# Patient Record
Sex: FEMALE | Employment: UNEMPLOYED | URBAN - METROPOLITAN AREA
[De-identification: names, ages, dates, MRNs, and addresses within clinical notes are randomized per-mention and may not be internally consistent; named-entity substitution may affect disease eponyms.]

---

## 2017-01-19 ENCOUNTER — APPOINTMENT (OUTPATIENT)
Dept: GENERAL RADIOLOGY | Age: 62
End: 2017-01-19
Attending: PHYSICIAN ASSISTANT
Payer: MEDICARE

## 2017-01-19 ENCOUNTER — HOSPITAL ENCOUNTER (EMERGENCY)
Age: 62
Discharge: HOME | End: 2017-01-19
Attending: EMERGENCY MEDICINE
Payer: MEDICARE

## 2017-01-19 VITALS
WEIGHT: 115 LBS | TEMPERATURE: 98 F | OXYGEN SATURATION: 100 % | HEART RATE: 74 BPM | RESPIRATION RATE: 18 BRPM | BODY MASS INDEX: 18.05 KG/M2 | HEIGHT: 67 IN

## 2017-01-19 DIAGNOSIS — J20.9 ACUTE BRONCHITIS, UNSPECIFIED ORGANISM: Primary | ICD-10-CM

## 2017-01-19 PROCEDURE — 71020 XR CHEST PA LAT: CPT

## 2017-01-19 PROCEDURE — 99282 EMERGENCY DEPT VISIT SF MDM: CPT

## 2017-01-19 RX ORDER — HYDROCODONE POLISTIREX AND CHLORPHENIRAMINE POLISTIREX 10; 8 MG/5ML; MG/5ML
5 SUSPENSION, EXTENDED RELEASE ORAL
Qty: 100 ML | Refills: 0 | Status: SHIPPED | OUTPATIENT
Start: 2017-01-19 | End: 2017-02-10

## 2017-01-19 RX ORDER — AZITHROMYCIN 250 MG/1
TABLET, FILM COATED ORAL
Qty: 6 TAB | Refills: 0 | Status: SHIPPED | OUTPATIENT
Start: 2017-01-19 | End: 2017-01-24

## 2017-01-19 NOTE — ED NOTES
I have reviewed discharge instructions with the patient. The patient verbalized understanding.   Pt ambulatory from ED

## 2017-01-19 NOTE — DISCHARGE INSTRUCTIONS
Bronchitis: Care Instructions  Your Care Instructions    Bronchitis is inflammation of the bronchial tubes, which carry air to the lungs. The tubes swell and produce mucus, or phlegm. The mucus and inflamed bronchial tubes make you cough. You may have trouble breathing. Most cases of bronchitis are caused by viruses like those that cause colds. Antibiotics usually do not help and they may be harmful. Bronchitis usually develops rapidly and lasts about 2 to 3 weeks in otherwise healthy people. Follow-up care is a key part of your treatment and safety. Be sure to make and go to all appointments, and call your doctor if you are having problems. It's also a good idea to know your test results and keep a list of the medicines you take. How can you care for yourself at home? · Take all medicines exactly as prescribed. Call your doctor if you think you are having a problem with your medicine. · Get some extra rest.  · Take an over-the-counter pain medicine, such as acetaminophen (Tylenol), ibuprofen (Advil, Motrin), or naproxen (Aleve) to reduce fever and relieve body aches. Read and follow all instructions on the label. · Do not take two or more pain medicines at the same time unless the doctor told you to. Many pain medicines have acetaminophen, which is Tylenol. Too much acetaminophen (Tylenol) can be harmful. · Take an over-the-counter cough medicine that contains dextromethorphan to help quiet a dry, hacking cough so that you can sleep. Avoid cough medicines that have more than one active ingredient. Read and follow all instructions on the label. · Breathe moist air from a humidifier, hot shower, or sink filled with hot water. The heat and moisture will thin mucus so you can cough it out. · Do not smoke. Smoking can make bronchitis worse. If you need help quitting, talk to your doctor about stop-smoking programs and medicines. These can increase your chances of quitting for good.   When should you call for help? Call 911 anytime you think you may need emergency care. For example, call if:  · You have severe trouble breathing. Call your doctor now or seek immediate medical care if:  · You have new or worse trouble breathing. · You cough up dark brown or bloody mucus (sputum). · You have a new or higher fever. · You have a new rash. Watch closely for changes in your health, and be sure to contact your doctor if:  · You cough more deeply or more often, especially if you notice more mucus or a change in the color of your mucus. · You are not getting better as expected. Where can you learn more? Go to http://avril-brody.info/. Enter H333 in the search box to learn more about \"Bronchitis: Care Instructions. \"  Current as of: May 23, 2016  Content Version: 11.1  © 1358-1002 [employer]. Care instructions adapted under license by [employer] (which disclaims liability or warranty for this information). If you have questions about a medical condition or this instruction, always ask your healthcare professional. [address] any warranty or liability for your use of this information.

## 2017-01-19 NOTE — ED PROVIDER NOTES
HPI Comments: Patient is a 61 y/o female who presents to ER c/o cough for 2 weeks and new onset of fever intermittent for the last day. Patient states she just does not feel good and has had a previous hx of walking pneumonia and bronchitis. She has not tried taking anything. Patient is well appearing and in no apparent distress. She is able to speak in full sentences. She denied any change in appetite, difficulty swallowing, SOB, chest pain, abd pain, nausea, vomiting or other complaints. Patient is a 62 y.o. female presenting with cough and fever. The history is provided by the patient. Cough   This is a recurrent problem. Pertinent negatives include no chest pain, no chills, no headaches, no sore throat, no shortness of breath, no nausea and no vomiting. Fever    Associated symptoms include congestion and cough. Pertinent negatives include no chest pain, no vomiting, no headaches, no sore throat and no shortness of breath. Past Medical History:   Diagnosis Date    Anxiety 8/26/2010    GERD (gastroesophageal reflux disease)     Grief reaction 8/26/2010    Headache disorder 8/26/2010    Headache(784.0)     Hypercholesterolemia     Pure hypercholesterolemia 8/26/2010    Walking pneumonia        Past Surgical History:   Procedure Laterality Date    Hx colonoscopy           Family History:   Problem Relation Age of Onset    Hypertension Father     Elevated Lipids Father     Heart Disease Father        Social History     Social History    Marital status:      Spouse name: N/A    Number of children: N/A    Years of education: N/A     Occupational History    Not on file.      Social History Main Topics    Smoking status: Former Smoker     Packs/day: 0.25     Years: 30.00    Smokeless tobacco: Never Used    Alcohol use No    Drug use: No    Sexual activity: Not Currently     Partners: Male     Other Topics Concern    Not on file     Social History Narrative         ALLERGIES: Aspirin; Darvocet a500 [propoxyphene n-acetaminophen]; Midrin [pccldbm-unrlvdmqg-ppndaviifozn]; Neurontin [gabapentin]; Ultram [tramadol]; and Codeine    Review of Systems   Constitutional: Positive for fever. Negative for chills and fatigue. HENT: Positive for congestion. Negative for sore throat. Eyes: Negative. Respiratory: Positive for cough. Negative for shortness of breath. Cardiovascular: Negative for chest pain and palpitations. Gastrointestinal: Negative for abdominal pain, nausea and vomiting. Genitourinary: Negative for dysuria. Musculoskeletal: Negative. Skin: Negative. Neurological: Negative for dizziness, weakness, light-headedness and headaches. Psychiatric/Behavioral: Negative. All other systems reviewed and are negative. Vitals:    01/19/17 1437   Pulse: 74   Resp: 18   Temp: 98 °F (36.7 °C)   SpO2: 100%   Weight: 52.2 kg (115 lb)   Height: 5' 7\" (1.702 m)            Physical Exam   Constitutional: She is oriented to person, place, and time. She appears well-developed and well-nourished. HENT:   Head: Normocephalic and atraumatic. Right Ear: Tympanic membrane normal.   Left Ear: Tympanic membrane normal.   Nose: Nose normal.   Mouth/Throat: Uvula is midline and mucous membranes are normal. Posterior oropharyngeal erythema present. No oropharyngeal exudate, posterior oropharyngeal edema or tonsillar abscesses. Eyes: Conjunctivae are normal. No scleral icterus. Neck: Neck supple. No JVD present. No tracheal deviation present. Cardiovascular: Normal rate, regular rhythm and normal heart sounds. Pulmonary/Chest: Effort normal and breath sounds normal. No respiratory distress. She has no wheezes. She exhibits tenderness. Anterior chest wall tenderness with palpation; increased with deep inspiration and cough   Abdominal: Soft. Musculoskeletal: Normal range of motion. Lymphadenopathy:     She has no cervical adenopathy.    Neurological: She is alert and oriented to person, place, and time. She has normal strength. Gait normal.   Skin: Skin is warm and dry. Psychiatric: She has a normal mood and affect. Nursing note and vitals reviewed. MDM  Number of Diagnoses or Management Options  Acute bronchitis, unspecified organism:   Diagnosis management comments: 2:51 PM  63 y/o female c/o cough/intermittent fevers for the last 2 weeks. Hx of pneumonia and bronchitis. Well appearing in no apparent distress. Will obtain cxr and plan for reeval.  Syd Bermudez PA-C    3:16 PM  Discussed xr results with pt. Based on previous hx, will give short course z pack and cough meds. All questions answered and patient in agreement with plan of care. Will plan for discharge.   Syd Bermudez PA-C    Clinical Impression:  Acute bronchitis           Amount and/or Complexity of Data Reviewed  Tests in the radiology section of CPT®: ordered and reviewed    Risk of Complications, Morbidity, and/or Mortality  Presenting problems: low  Diagnostic procedures: low  Management options: low    Critical Care  Total time providing critical care: < 30 minutes    Patient Progress  Patient progress: stable    ED Course       Procedures

## 2017-02-10 ENCOUNTER — HOSPITAL ENCOUNTER (EMERGENCY)
Age: 62
Discharge: HOME | End: 2017-02-10
Attending: EMERGENCY MEDICINE
Payer: MEDICARE

## 2017-02-10 ENCOUNTER — APPOINTMENT (OUTPATIENT)
Dept: GENERAL RADIOLOGY | Age: 62
End: 2017-02-10
Attending: EMERGENCY MEDICINE
Payer: MEDICARE

## 2017-02-10 VITALS
DIASTOLIC BLOOD PRESSURE: 81 MMHG | RESPIRATION RATE: 18 BRPM | HEIGHT: 67 IN | BODY MASS INDEX: 18.05 KG/M2 | HEART RATE: 69 BPM | OXYGEN SATURATION: 100 % | SYSTOLIC BLOOD PRESSURE: 143 MMHG | TEMPERATURE: 99 F | WEIGHT: 115 LBS

## 2017-02-10 DIAGNOSIS — B34.9 VIRAL ILLNESS: ICD-10-CM

## 2017-02-10 DIAGNOSIS — R05.9 COUGH: Primary | ICD-10-CM

## 2017-02-10 LAB
FLUAV AG NPH QL IA: NEGATIVE
FLUBV AG NOSE QL IA: NEGATIVE

## 2017-02-10 PROCEDURE — 99282 EMERGENCY DEPT VISIT SF MDM: CPT

## 2017-02-10 PROCEDURE — 87804 INFLUENZA ASSAY W/OPTIC: CPT | Performed by: EMERGENCY MEDICINE

## 2017-02-10 PROCEDURE — 71020 XR CHEST PA LAT: CPT

## 2017-02-10 RX ORDER — BENZONATATE 100 MG/1
100 CAPSULE ORAL
Qty: 30 CAP | Refills: 0 | Status: SHIPPED | OUTPATIENT
Start: 2017-02-10 | End: 2017-02-17

## 2017-02-10 RX ORDER — FLUTICASONE PROPIONATE 50 MCG
2 SPRAY, SUSPENSION (ML) NASAL DAILY
Qty: 1 BOTTLE | Refills: 0 | Status: SHIPPED | OUTPATIENT
Start: 2017-02-10 | End: 2017-07-24

## 2017-02-10 NOTE — DISCHARGE INSTRUCTIONS
Cough: Care Instructions  Your Care Instructions  A cough is your body's response to something that bothers your throat or airways. Many things can cause a cough. You might cough because of a cold or the flu, bronchitis, or asthma. Smoking, postnasal drip, allergies, and stomach acid that backs up into your throat also can cause coughs. A cough is a symptom, not a disease. Most coughs stop when the cause, such as a cold, goes away. You can take a few steps at home to cough less and feel better. Follow-up care is a key part of your treatment and safety. Be sure to make and go to all appointments, and call your doctor if you are having problems. It's also a good idea to know your test results and keep a list of the medicines you take. How can you care for yourself at home? · Drink lots of water and other fluids. This helps thin the mucus and soothes a dry or sore throat. Honey or lemon juice in hot water or tea may ease a dry cough. · Take cough medicine as directed by your doctor. · Prop up your head on pillows to help you breathe and ease a dry cough. · Try cough drops to soothe a dry or sore throat. Cough drops don't stop a cough. Medicine-flavored cough drops are no better than candy-flavored drops or hard candy. · Do not smoke. Avoid secondhand smoke. If you need help quitting, talk to your doctor about stop-smoking programs and medicines. These can increase your chances of quitting for good. When should you call for help? Call 911 anytime you think you may need emergency care. For example, call if:  · You have severe trouble breathing. Call your doctor now or seek immediate medical care if:  · You cough up blood. · You have new or worse trouble breathing. · You have a new or higher fever. · You have a new rash.   Watch closely for changes in your health, and be sure to contact your doctor if:  · You cough more deeply or more often, especially if you notice more mucus or a change in the color of your mucus. · You have new symptoms, such as a sore throat, an earache, or sinus pain. · You do not get better as expected. Where can you learn more? Go to http://avril-brody.info/. Enter D279 in the search box to learn more about \"Cough: Care Instructions. \"  Current as of: May 27, 2016  Content Version: 11.1  © 4454-6065 [employer]. Care instructions adapted under license by [employer] (which disclaims liability or warranty for this information). If you have questions about a medical condition or this instruction, always ask your healthcare professional. [place] [address] any warranty or liability for your use of this information. Viral Infections: Care Instructions  Your Care Instructions  You don't feel well, but it's not clear what's causing it. You may have a viral infection. Viruses cause many illnesses, such as the common cold, influenza, fever, rashes, and the diarrhea, nausea, and vomiting that are often called \"stomach flu. \" You may wonder if antibiotic medicines could make you feel better. But antibiotics only treat infections caused by bacteria. They don't work on viruses. The good news is that viral infections usually aren't serious. Most will go away in a few days without medical treatment. In the meantime, there are a few things you can do to make yourself more comfortable. Follow-up care is a key part of your treatment and safety. Be sure to make and go to all appointments, and call your doctor if you are having problems. It's also a good idea to know your test results and keep a list of the medicines you take. How can you care for yourself at home? · Get plenty of rest if you feel tired. · Take an over-the-counter pain medicine if needed, such as acetaminophen (Tylenol), ibuprofen (Advil, Motrin), or naproxen (Aleve). Read and follow all instructions on the label.   · Be careful when taking over-the-counter cold or flu medicines and Tylenol at the same time. Many of these medicines have acetaminophen, which is Tylenol. Read the labels to make sure that you are not taking more than the recommended dose. Too much acetaminophen (Tylenol) can be harmful. · Drink plenty of fluids, enough so that your urine is light yellow or clear like water. If you have kidney, heart, or liver disease and have to limit fluids, talk with your doctor before you increase the amount of fluids you drink. · Stay home from work, school, and other public places while you have a fever. When should you call for help? Call 911 anytime you think you may need emergency care. For example, call if:  · You have severe trouble breathing. · You passed out (lost consciousness). Call your doctor now or seek immediate medical care if:  · You seem to be getting much sicker. · You have a new or higher fever. · You have blood in your stools. · You have new belly pain, or your pain gets worse. · You have a new rash. Watch closely for changes in your health, and be sure to contact your doctor if:  · You start to get better and then get worse. · You do not get better as expected. Where can you learn more? Go to http://avril-brody.info/. Enter P077 in the search box to learn more about \"Viral Infections: Care Instructions. \"  Current as of: May 24, 2016  Content Version: 11.1  © 1693-5157 [employer]. Care instructions adapted under license by [employer] (which disclaims liability or warranty for this information). If you have questions about a medical condition or this instruction, always ask your healthcare professional. [address] any warranty or liability for your use of this information.

## 2017-02-10 NOTE — ED PROVIDER NOTES
HPI Comments: Shelly Lee is a 62 y.o. female with history of anxiety that presents to the ED with a complaint of cough, fever and body ache. Symptoms started approximately 4 days ago and have progressed since. Patient took one tylenol this morning which she states may have his her fever today. Admits to coughing up brown mucus and fatigue. Denies SOB, HA, NVD, urinary sx, abdominal pain    Patient is a 62 y.o. female presenting with general illness, fever, and cough. Generalized Body Aches     Fever    Associated symptoms include cough. Cough          Past Medical History:   Diagnosis Date    Anxiety 8/26/2010    GERD (gastroesophageal reflux disease)     Grief reaction 8/26/2010    Headache disorder 8/26/2010    Headache(784.0)     Hypercholesterolemia     Pure hypercholesterolemia 8/26/2010    Walking pneumonia        Past Surgical History:   Procedure Laterality Date    Hx colonoscopy           Family History:   Problem Relation Age of Onset    Hypertension Father     Elevated Lipids Father     Heart Disease Father        Social History     Social History    Marital status:      Spouse name: N/A    Number of children: N/A    Years of education: N/A     Occupational History    Not on file. Social History Main Topics    Smoking status: Former Smoker     Packs/day: 0.25     Years: 30.00    Smokeless tobacco: Never Used    Alcohol use No    Drug use: No    Sexual activity: Not Currently     Partners: Male     Other Topics Concern    Not on file     Social History Narrative         ALLERGIES: Aspirin; Darvocet a500 [propoxyphene n-acetaminophen]; Midrin [bmhjjix-myqhpcgin-fnbcohfpccbr]; Neurontin [gabapentin]; Ultram [tramadol]; and Codeine    Review of Systems   Constitutional: Positive for fever. Respiratory: Positive for cough. All other systems reviewed and are negative.       Vitals:    02/10/17 1438   BP: 143/81   Pulse: 69   Resp: 18   Temp: 99 °F (37.2 °C)   SpO2: 100%   Weight: 52.2 kg (115 lb)   Height: 5' 7\" (1.702 m)            Physical Exam   Constitutional: She is oriented to person, place, and time. She appears well-developed and well-nourished. No distress. Patient appears anxious   HENT:   Head: Normocephalic and atraumatic. Right Ear: External ear normal.   Left Ear: External ear normal.   Nose: Nose normal. No mucosal edema or rhinorrhea. Mouth/Throat: Uvula is midline. Clear to yellow PND noted   Eyes: Conjunctivae are normal. Pupils are equal, round, and reactive to light. Neck: Normal range of motion. Neck supple. Cardiovascular: Normal rate and normal heart sounds. No murmur heard. Pulmonary/Chest: Effort normal and breath sounds normal. No respiratory distress. She has no wheezes. She has no rales. Musculoskeletal: Normal range of motion. Neurological: She is alert and oriented to person, place, and time. Skin: Skin is warm and dry. Psychiatric: She has a normal mood and affect. Her behavior is normal.   Nursing note and vitals reviewed. MDM  Number of Diagnoses or Management Options  Cough:   Elevated blood pressure:   Viral illness:   Diagnosis management comments: XR Results (most recent):  Results from Hospital Encounter encounter on 02/10/17    XR CHEST PA LAT    Narrative  Chest x-rays, PA and lateral views:        INDICATION:    Persistent cough and fever. Chest pain. History of GE reflux disease. COMPARISON STUDY: Chest x-ray dated 1/19/2017, 12/1/2016. FINDINGS:    Cardiac size and pulmonary vascularity are normal. Lungs are mildly  hyperinflated on AP diameter, indicating mild COPD. Otherwise, lungs are clear  without definable acute processes. Noted made of mild dextroscoliosis in  thoracic spine. No evidence of pneumothorax or pleural effusion. Impression  IMPRESSION:    Stable chest x-ray, with evidence of mild COPD, without definable acute process.        Labs Reviewed  INFLUENZA A & B AG (RAPID TEST)    Negative    Impression: discharge home  Increase fluids  Tylenol/Motrin for fever  Take medications as instructed  Follow up with PCP         Amount and/or Complexity of Data Reviewed  Clinical lab tests: ordered and reviewed  Tests in the radiology section of CPT®: ordered and reviewed    Risk of Complications, Morbidity, and/or Mortality  Presenting problems: moderate  Diagnostic procedures: moderate  Management options: moderate    Patient Progress  Patient progress: stable    ED Course       Procedures    I was personally available for consultation in the emergency department. I have reviewed the chart prior to the patient being discharged and agree with the documentation recorded by the [facility] AND [facility], including the assessment, treatment plan, and disposition.   Jaylan Griffin MD

## 2017-02-12 ENCOUNTER — HOSPITAL ENCOUNTER (EMERGENCY)
Age: 62
Discharge: HOME | End: 2017-02-12
Attending: PHYSICIAN ASSISTANT
Payer: MEDICARE

## 2017-02-12 ENCOUNTER — APPOINTMENT (OUTPATIENT)
Dept: GENERAL RADIOLOGY | Age: 62
End: 2017-02-12
Attending: EMERGENCY MEDICINE
Payer: MEDICARE

## 2017-02-12 VITALS
HEART RATE: 67 BPM | HEIGHT: 66 IN | SYSTOLIC BLOOD PRESSURE: 141 MMHG | RESPIRATION RATE: 22 BRPM | TEMPERATURE: 97.9 F | OXYGEN SATURATION: 98 % | DIASTOLIC BLOOD PRESSURE: 79 MMHG | WEIGHT: 117 LBS | BODY MASS INDEX: 18.8 KG/M2

## 2017-02-12 DIAGNOSIS — B34.9 VIRAL ILLNESS: Primary | ICD-10-CM

## 2017-02-12 DIAGNOSIS — R05.9 COUGH: ICD-10-CM

## 2017-02-12 PROCEDURE — 99281 EMR DPT VST MAYX REQ PHY/QHP: CPT

## 2017-02-12 PROCEDURE — 71020 XR CHEST PA LAT: CPT

## 2017-02-12 NOTE — DISCHARGE INSTRUCTIONS
Cough: Care Instructions  Your Care Instructions  A cough is your body's response to something that bothers your throat or airways. Many things can cause a cough. You might cough because of a cold or the flu, bronchitis, or asthma. Smoking, postnasal drip, allergies, and stomach acid that backs up into your throat also can cause coughs. A cough is a symptom, not a disease. Most coughs stop when the cause, such as a cold, goes away. You can take a few steps at home to cough less and feel better. Follow-up care is a key part of your treatment and safety. Be sure to make and go to all appointments, and call your doctor if you are having problems. It's also a good idea to know your test results and keep a list of the medicines you take. How can you care for yourself at home? · Drink lots of water and other fluids. This helps thin the mucus and soothes a dry or sore throat. Honey or lemon juice in hot water or tea may ease a dry cough. · Take cough medicine as directed by your doctor. · Prop up your head on pillows to help you breathe and ease a dry cough. · Try cough drops to soothe a dry or sore throat. Cough drops don't stop a cough. Medicine-flavored cough drops are no better than candy-flavored drops or hard candy. · Do not smoke. Avoid secondhand smoke. If you need help quitting, talk to your doctor about stop-smoking programs and medicines. These can increase your chances of quitting for good. When should you call for help? Call 911 anytime you think you may need emergency care. For example, call if:  · You have severe trouble breathing. Call your doctor now or seek immediate medical care if:  · You cough up blood. · You have new or worse trouble breathing. · You have a new or higher fever. · You have a new rash.   Watch closely for changes in your health, and be sure to contact your doctor if:  · You cough more deeply or more often, especially if you notice more mucus or a change in the color of your mucus. · You have new symptoms, such as a sore throat, an earache, or sinus pain. · You do not get better as expected. Where can you learn more? Go to http://avril-brody.info/. Enter D279 in the search box to learn more about \"Cough: Care Instructions. \"  Current as of: May 27, 2016  Content Version: 11.1  © 0250-1175 [employer]. Care instructions adapted under license by [employer] (which disclaims liability or warranty for this information). If you have questions about a medical condition or this instruction, always ask your healthcare professional. [place] [address] any warranty or liability for your use of this information. Viral Infections: Care Instructions  Your Care Instructions  You don't feel well, but it's not clear what's causing it. You may have a viral infection. Viruses cause many illnesses, such as the common cold, influenza, fever, rashes, and the diarrhea, nausea, and vomiting that are often called \"stomach flu. \" You may wonder if antibiotic medicines could make you feel better. But antibiotics only treat infections caused by bacteria. They don't work on viruses. The good news is that viral infections usually aren't serious. Most will go away in a few days without medical treatment. In the meantime, there are a few things you can do to make yourself more comfortable. Follow-up care is a key part of your treatment and safety. Be sure to make and go to all appointments, and call your doctor if you are having problems. It's also a good idea to know your test results and keep a list of the medicines you take. How can you care for yourself at home? · Get plenty of rest if you feel tired. · Take an over-the-counter pain medicine if needed, such as acetaminophen (Tylenol), ibuprofen (Advil, Motrin), or naproxen (Aleve). Read and follow all instructions on the label.   · Be careful when taking over-the-counter cold or flu medicines and Tylenol at the same time. Many of these medicines have acetaminophen, which is Tylenol. Read the labels to make sure that you are not taking more than the recommended dose. Too much acetaminophen (Tylenol) can be harmful. · Drink plenty of fluids, enough so that your urine is light yellow or clear like water. If you have kidney, heart, or liver disease and have to limit fluids, talk with your doctor before you increase the amount of fluids you drink. · Stay home from work, school, and other public places while you have a fever. When should you call for help? Call 911 anytime you think you may need emergency care. For example, call if:  · You have severe trouble breathing. · You passed out (lost consciousness). Call your doctor now or seek immediate medical care if:  · You seem to be getting much sicker. · You have a new or higher fever. · You have blood in your stools. · You have new belly pain, or your pain gets worse. · You have a new rash. Watch closely for changes in your health, and be sure to contact your doctor if:  · You start to get better and then get worse. · You do not get better as expected. Where can you learn more? Go to http://avril-brody.info/. Enter U611 in the search box to learn more about \"Viral Infections: Care Instructions. \"  Current as of: May 24, 2016  Content Version: 11.1  © 1792-0014 [employer]. Care instructions adapted under license by [employer] (which disclaims liability or warranty for this information). If you have questions about a medical condition or this instruction, always ask your healthcare professional. [address] any warranty or liability for your use of this information.

## 2017-02-12 NOTE — ED NOTES
Provider gave verbal discharge instructions given to pt with verbalized understanding of home care and follow up.

## 2017-02-12 NOTE — ED PROVIDER NOTES
HPI Comments: Jossy Pereira is a 62 y.o. female that returns tot  ED with continued cough fatigue and fever. Patient entered exam room swearing about the recent medications that she received  At her last visit 2 days ago. patient states that he was prescribed a z-pack and Tussionex on her last visit (1/19) and she cleared up immediately. I explained that I would treat her based on the exam and test results and she states that she again wants those medications. Patient is a 62 y.o. female presenting with cough and fatigue. Cough     Fatigue          Past Medical History:   Diagnosis Date    Anxiety 8/26/2010    GERD (gastroesophageal reflux disease)     Grief reaction 8/26/2010    Headache disorder 8/26/2010    Headache(784.0)     Hypercholesterolemia     Pure hypercholesterolemia 8/26/2010    Walking pneumonia        Past Surgical History:   Procedure Laterality Date    Hx colonoscopy           Family History:   Problem Relation Age of Onset    Hypertension Father     Elevated Lipids Father     Heart Disease Father        Social History     Social History    Marital status:      Spouse name: N/A    Number of children: N/A    Years of education: N/A     Occupational History    Not on file. Social History Main Topics    Smoking status: Former Smoker     Packs/day: 0.25     Years: 30.00    Smokeless tobacco: Never Used    Alcohol use No    Drug use: No    Sexual activity: Not Currently     Partners: Male     Other Topics Concern    Not on file     Social History Narrative         ALLERGIES: Aspirin; Darvocet a500 [propoxyphene n-acetaminophen]; Midrin [xeqlllo-wptohsuoc-zjwpirrtnkqn]; Neurontin [gabapentin]; Ultram [tramadol]; and Codeine    Review of Systems   Constitutional: Positive for fatigue. Respiratory: Positive for cough. All other systems reviewed and are negative.       Vitals:    02/12/17 1534   BP: 141/79   Pulse: 67   Resp: 22   Temp: 97.9 °F (36.6 °C)   SpO2: 98%   Weight: 53.1 kg (117 lb)   Height: 5' 6\" (1.676 m)            Physical Exam   Constitutional: She is oriented to person, place, and time. She appears well-developed and well-nourished. She appears distressed. HENT:   Head: Normocephalic and atraumatic. Eyes: Conjunctivae are normal. Pupils are equal, round, and reactive to light. Neck: Normal range of motion. Cardiovascular: Normal rate, regular rhythm and normal heart sounds. Pulmonary/Chest: Effort normal. No respiratory distress. Musculoskeletal: Normal range of motion. Neurological: She is alert and oriented to person, place, and time. Skin: Skin is warm and dry. Psychiatric: She has a normal mood and affect. Her behavior is normal.   Nursing note and vitals reviewed. MDM  Number of Diagnoses or Management Options  Diagnosis management comments: PAtient was non stop coughing in exam room but she is sitting directly in front of be in the waiting room and is sitting comfortably and in no distress. Last Xray: again negative for acute process    Impression: Viral illness, cough    Plan: DIscharge home  Continue Tylenol/Motrin for fever  Tessalon Perles for cough  Follow up with PCP       Amount and/or Complexity of Data Reviewed  Tests in the radiology section of CPT®: ordered and reviewed      ED Course       Procedures      Vitals:  Patient Vitals for the past 12 hrs:   Temp Pulse Resp BP SpO2   02/12/17 1534 97.9 °F (36.6 °C) 67 22 141/79 98 %         Medications ordered:   Medications - No data to display      Lab findings:  No results found for this or any previous visit (from the past 12 hour(s)). X-Ray, CT or other radiology findings or impressions:  XR CHEST PA LAT    (Results Pending)       Progress notes, Consult notes or additional Procedure notes:       Disposition:  Diagnosis: No diagnosis found.     Disposition: discharge home    Follow-up Information     None           Patient's Medications   Start Taking No medications on file   Continue Taking    ALBUTEROL SULFATE (PROVENTIL;VENTOLIN) 2.5 MG/0.5 ML NEBU NEBULIZER SOLUTION    by Nebulization route once. ALPRAZOLAM (XANAX) 2 MG TABLET    Take 1 Tab by mouth nightly as needed for Anxiety. Max Daily Amount: 2 mg. BENZONATATE (TESSALON PERLES) 100 MG CAPSULE    Take 1 Cap by mouth three (3) times daily as needed for Cough for up to 7 days. FLUTICASONE (FLONASE) 50 MCG/ACTUATION NASAL SPRAY    2 Sprays by Both Nostrils route daily. MULTIVITAMIN (ONE A DAY) TABLET    Take 1 Tab by mouth daily. OMEPRAZOLE (PRILOSEC) 40 MG CAPSULE    Take 40 mg by mouth daily. PAROXETINE (PAXIL) 40 MG TABLET    Take 40 mg by mouth daily. SIMVASTATIN (ZOCOR) 40 MG TABLET    Take 1 Tab by mouth nightly.    These Medications have changed    No medications on file   Stop Taking    No medications on file

## 2017-03-23 ENCOUNTER — APPOINTMENT (OUTPATIENT)
Dept: GENERAL RADIOLOGY | Age: 62
End: 2017-03-23
Attending: EMERGENCY MEDICINE
Payer: MEDICARE

## 2017-03-23 ENCOUNTER — HOSPITAL ENCOUNTER (EMERGENCY)
Age: 62
Discharge: HOME | End: 2017-03-23
Attending: EMERGENCY MEDICINE
Payer: MEDICARE

## 2017-03-23 VITALS
SYSTOLIC BLOOD PRESSURE: 151 MMHG | HEART RATE: 71 BPM | TEMPERATURE: 97.7 F | HEIGHT: 66 IN | OXYGEN SATURATION: 99 % | RESPIRATION RATE: 18 BRPM | DIASTOLIC BLOOD PRESSURE: 77 MMHG | BODY MASS INDEX: 18.96 KG/M2 | WEIGHT: 118 LBS

## 2017-03-23 DIAGNOSIS — S20.219A CONTUSION OF CHEST WALL, UNSPECIFIED LATERALITY, INITIAL ENCOUNTER: Primary | ICD-10-CM

## 2017-03-23 PROCEDURE — 99283 EMERGENCY DEPT VISIT LOW MDM: CPT

## 2017-03-23 PROCEDURE — 93005 ELECTROCARDIOGRAM TRACING: CPT

## 2017-03-23 PROCEDURE — 71020 XR CHEST PA LAT: CPT

## 2017-03-23 RX ORDER — ACETAMINOPHEN AND CODEINE PHOSPHATE 300; 30 MG/1; MG/1
1-2 TABLET ORAL
Qty: 20 TAB | Refills: 0 | Status: SHIPPED | OUTPATIENT
Start: 2017-03-23 | End: 2017-03-23

## 2017-03-23 RX ORDER — HYDROCODONE BITARTRATE AND ACETAMINOPHEN 5; 325 MG/1; MG/1
1 TABLET ORAL
Qty: 8 TAB | Refills: 0 | Status: SHIPPED | OUTPATIENT
Start: 2017-03-23 | End: 2017-07-08

## 2017-03-23 NOTE — DISCHARGE INSTRUCTIONS
Musculoskeletal Chest Pain: Care Instructions  Your Care Instructions  Chest pain is not always a sign that something is wrong with your heart or that you have another serious problem. The doctor thinks your chest pain is caused by strained muscles or ligaments, inflamed chest cartilage, or another problem in your chest, rather than by your heart. You may need more tests to find the cause of your chest pain. Follow-up care is a key part of your treatment and safety. Be sure to make and go to all appointments, and call your doctor if you are having problems. Its also a good idea to know your test results and keep a list of the medicines you take. How can you care for yourself at home? · Take pain medicines exactly as directed. ¨ If the doctor gave you a prescription medicine for pain, take it as prescribed. ¨ If you are not taking a prescription pain medicine, ask your doctor if you can take an over-the-counter medicine. · Rest and protect the sore area. · Stop, change, or take a break from any activity that may be causing your pain or soreness. · Put ice or a cold pack on the sore area for 10 to 20 minutes at a time. Try to do this every 1 to 2 hours for the next 3 days (when you are awake) or until the swelling goes down. Put a thin cloth between the ice and your skin. · After 2 or 3 days, apply a heating pad set on low or a warm cloth to the area that hurts. Some doctors suggest that you go back and forth between hot and cold. · Do not wrap or tape your ribs for support. This may cause you to take smaller breaths, which could increase your risk of lung problems. · Mentholated creams such as Bengay or Icy Hot may soothe sore muscles. Follow the instructions on the package. · Follow your doctor's instructions for exercising. · Gentle stretching and massage may help you get better faster. Stretch slowly to the point just before pain begins, and hold the stretch for at least 15 to 30 seconds.  Do this 3 or 4 times a day. Stretch just after you have applied heat. · As your pain gets better, slowly return to your normal activities. Any increased pain may be a sign that you need to rest a while longer. When should you call for help? Call 911 anytime you think you may need emergency care. For example, call if:  · You have chest pain or pressure. This may occur with:  ¨ Sweating. ¨ Shortness of breath. ¨ Nausea or vomiting. ¨ Pain that spreads from the chest to the neck, jaw, or one or both shoulders or arms. ¨ Dizziness or lightheadedness. ¨ A fast or uneven pulse. After calling 911, chew 1 adult-strength aspirin. Wait for an ambulance. Do not try to drive yourself. · You have sudden chest pain and shortness of breath, or you cough up blood. Call your doctor now or seek immediate medical care if:  · You have any trouble breathing. · Your chest pain gets worse. · Your chest pain occurs consistently with exercise and is relieved by rest.  Watch closely for changes in your health, and be sure to contact your doctor if:  · Your chest pain does not get better after 1 week. Where can you learn more? Go to http://avril-brody.info/. Enter V293 in the search box to learn more about \"Musculoskeletal Chest Pain: Care Instructions. \"  Current as of: May 27, 2016  Content Version: 11.1  © 8683-4371 [employer]. Care instructions adapted under license by [employer] (which disclaims liability or warranty for this information). If you have questions about a medical condition or this instruction, always ask your healthcare professional. [place] [address] any warranty or liability for your use of this information.
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
## 2017-03-24 LAB
ATRIAL RATE: 66 BPM
CALCULATED P AXIS, ECG09: 68 DEGREES
CALCULATED R AXIS, ECG10: 31 DEGREES
CALCULATED T AXIS, ECG11: 59 DEGREES
DIAGNOSIS, 93000: NORMAL
P-R INTERVAL, ECG05: 142 MS
Q-T INTERVAL, ECG07: 402 MS
QRS DURATION, ECG06: 96 MS
QTC CALCULATION (BEZET), ECG08: 421 MS
VENTRICULAR RATE, ECG03: 66 BPM

## 2017-07-08 ENCOUNTER — HOSPITAL ENCOUNTER (EMERGENCY)
Age: 62
Discharge: HOME | End: 2017-07-08
Attending: EMERGENCY MEDICINE
Payer: MEDICARE

## 2017-07-08 ENCOUNTER — APPOINTMENT (OUTPATIENT)
Dept: GENERAL RADIOLOGY | Age: 62
End: 2017-07-08
Attending: PHYSICIAN ASSISTANT
Payer: MEDICARE

## 2017-07-08 VITALS
SYSTOLIC BLOOD PRESSURE: 144 MMHG | WEIGHT: 122 LBS | RESPIRATION RATE: 20 BRPM | TEMPERATURE: 98.5 F | DIASTOLIC BLOOD PRESSURE: 78 MMHG | BODY MASS INDEX: 23.95 KG/M2 | HEART RATE: 78 BPM | HEIGHT: 60 IN | OXYGEN SATURATION: 97 %

## 2017-07-08 DIAGNOSIS — S92.502A FRACTURE OF FIFTH TOE, LEFT, CLOSED, INITIAL ENCOUNTER: Primary | ICD-10-CM

## 2017-07-08 PROCEDURE — 99282 EMERGENCY DEPT VISIT SF MDM: CPT

## 2017-07-08 PROCEDURE — 73630 X-RAY EXAM OF FOOT: CPT

## 2017-07-08 RX ORDER — HYDROCODONE BITARTRATE AND ACETAMINOPHEN 5; 325 MG/1; MG/1
1 TABLET ORAL
Qty: 12 TAB | Refills: 0 | Status: SHIPPED | OUTPATIENT
Start: 2017-07-08 | End: 2017-08-01
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
## 2017-07-08 NOTE — DISCHARGE INSTRUCTIONS
Broken Toe: Care Instructions  Your Care Instructions  You have broken (fractured) a bone in your toe. This kind of fracture does not need a special cast or brace. \"Beverly-taping\" your broken toe to a healthy toe next to it is almost always enough to treat the problem and ease symptoms. The toe may take 4 weeks or more to heal.  You heal best when you take good care of yourself. Eat a variety of healthy foods, and don't smoke. Follow-up care is a key part of your treatment and safety. Be sure to make and go to all appointments, and call your doctor if you are having problems. It's also a good idea to know your test results and keep a list of the medicines you take. How can you care for yourself at home? · Be safe with medicines. Take pain medicines exactly as directed. ¨ If the doctor gave you a prescription medicine for pain, take it as prescribed. ¨ If you are not taking a prescription pain medicine, ask your doctor if you can take an over-the-counter medicine. · If your toe is taped to the toe next to it, your doctor has shown you how to change the tape. Protect the skin by putting something soft, such as felt or foam, between your toes before you tape them together. Never tape the toes together skin-to-skin. Your broken toe may need to be beverly-taped for 2 to 4 weeks to heal.  · Rest and protect your toe. Do not walk on it until you can do so without too much pain. If the doctor has told you to use crutches, use them as instructed. · Put ice or a cold pack on your toe for 10 to 20 minutes at a time. Try to do this every 1 to 2 hours for the next 3 days (when you are awake) or until the swelling goes down. Put a thin cloth between the ice and your skin. · Prop up your foot on a pillow when you ice it or anytime you sit or lie down. Try to keep it above the level of your heart. This will help reduce swelling. · Make sure you go to your follow-up appointments.  Your doctor will need to check that your toe is healing right. When should you call for help? Call your doctor now or seek immediate medical care if:  · You have severe pain. · Your toe is cool or pale or changes color. · You have tingling, weakness, or numbness in your toe. Watch closely for changes in your health, and be sure to contact your doctor if:  · Pain and swelling get worse or are not improving. · You have a new or worse deformity in your toe. Where can you learn more? Go to http://avril-brody.info/. Enter U608 in the search box to learn more about \"Broken Toe: Care Instructions. \"  Current as of: October 19, 2016  Content Version: 11.3  © 1604-3908 [employer], [employer]. Care instructions adapted under license by [employer] (which disclaims liability or warranty for this information). If you have questions about a medical condition or this instruction, always ask your healthcare professional. [address] any warranty or liability for your use of this information.

## 2017-07-24 ENCOUNTER — HOSPITAL ENCOUNTER (EMERGENCY)
Age: 62
Discharge: HOME | DRG: 885 | End: 2017-07-24
Attending: EMERGENCY MEDICINE
Payer: MEDICARE

## 2017-07-24 ENCOUNTER — HOSPITAL ENCOUNTER (INPATIENT)
Age: 62
LOS: 7 days | Discharge: HOME | DRG: 885 | End: 2017-08-01
Attending: EMERGENCY MEDICINE | Admitting: PSYCHIATRY & NEUROLOGY
Payer: MEDICARE

## 2017-07-24 VITALS
BODY MASS INDEX: 18.33 KG/M2 | HEIGHT: 65 IN | OXYGEN SATURATION: 99 % | TEMPERATURE: 98.1 F | HEART RATE: 88 BPM | DIASTOLIC BLOOD PRESSURE: 89 MMHG | RESPIRATION RATE: 20 BRPM | WEIGHT: 110 LBS | SYSTOLIC BLOOD PRESSURE: 155 MMHG

## 2017-07-24 DIAGNOSIS — R00.2 PALPITATIONS: ICD-10-CM

## 2017-07-24 DIAGNOSIS — R46.89 DISORGANIZED BEHAVIOR: ICD-10-CM

## 2017-07-24 DIAGNOSIS — F41.9 ANXIETY: Primary | ICD-10-CM

## 2017-07-24 DIAGNOSIS — R45.850 HOMICIDAL IDEATION: Primary | ICD-10-CM

## 2017-07-24 DIAGNOSIS — R03.0 ELEVATED BLOOD PRESSURE READING: ICD-10-CM

## 2017-07-24 LAB
AMPHET UR QL SCN: NEGATIVE
ANION GAP BLD CALC-SCNC: 9 MMOL/L (ref 3–18)
APAP SERPL-MCNC: <2 UG/ML (ref 10–30)
APPEARANCE UR: ABNORMAL
BARBITURATES UR QL SCN: NEGATIVE
BASOPHILS # BLD AUTO: 0.1 K/UL (ref 0–0.06)
BASOPHILS # BLD: 1 % (ref 0–2)
BENZODIAZ UR QL: POSITIVE
BILIRUB UR QL: NEGATIVE
BUN SERPL-MCNC: 20 MG/DL (ref 7–18)
BUN/CREAT SERPL: 27 (ref 12–20)
CALCIUM SERPL-MCNC: 9.3 MG/DL (ref 8.5–10.1)
CANNABINOIDS UR QL SCN: NEGATIVE
CHLORIDE SERPL-SCNC: 101 MMOL/L (ref 100–108)
CK MB CFR SERPL CALC: 2.2 % (ref 0–4)
CK MB SERPL-MCNC: 2.9 NG/ML (ref 5–25)
CK SERPL-CCNC: 134 U/L (ref 26–192)
CO2 SERPL-SCNC: 32 MMOL/L (ref 21–32)
COCAINE UR QL SCN: NEGATIVE
COLOR UR: YELLOW
CREAT SERPL-MCNC: 0.75 MG/DL (ref 0.6–1.3)
DIFFERENTIAL METHOD BLD: ABNORMAL
EOSINOPHIL # BLD: 0 K/UL (ref 0–0.4)
EOSINOPHIL NFR BLD: 0 % (ref 0–5)
ERYTHROCYTE [DISTWIDTH] IN BLOOD BY AUTOMATED COUNT: 12.7 % (ref 11.6–14.5)
ETHANOL SERPL-MCNC: <3 MG/DL (ref 0–3)
GLUCOSE SERPL-MCNC: 122 MG/DL (ref 74–99)
GLUCOSE UR STRIP.AUTO-MCNC: NEGATIVE MG/DL
HCT VFR BLD AUTO: 41.1 % (ref 35–45)
HDSCOM,HDSCOM: ABNORMAL
HGB BLD-MCNC: 13.8 G/DL (ref 12–16)
HGB UR QL STRIP: NEGATIVE
KETONES UR QL STRIP.AUTO: 15 MG/DL
LEUKOCYTE ESTERASE UR QL STRIP.AUTO: NEGATIVE
LYMPHOCYTES # BLD AUTO: 25 % (ref 21–52)
LYMPHOCYTES # BLD: 2.4 K/UL (ref 0.9–3.6)
MCH RBC QN AUTO: 33 PG (ref 24–34)
MCHC RBC AUTO-ENTMCNC: 33.6 G/DL (ref 31–37)
MCV RBC AUTO: 98.3 FL (ref 74–97)
METHADONE UR QL: NEGATIVE
MONOCYTES # BLD: 0.5 K/UL (ref 0.05–1.2)
MONOCYTES NFR BLD AUTO: 5 % (ref 3–10)
NEUTS SEG # BLD: 6.8 K/UL (ref 1.8–8)
NEUTS SEG NFR BLD AUTO: 69 % (ref 40–73)
NITRITE UR QL STRIP.AUTO: NEGATIVE
OPIATES UR QL: POSITIVE
PCP UR QL: NEGATIVE
PH UR STRIP: 8 [PH] (ref 5–8)
PLATELET # BLD AUTO: 304 K/UL (ref 135–420)
PMV BLD AUTO: 8.9 FL (ref 9.2–11.8)
POTASSIUM SERPL-SCNC: 3.9 MMOL/L (ref 3.5–5.5)
PROT UR STRIP-MCNC: NEGATIVE MG/DL
RBC # BLD AUTO: 4.18 M/UL (ref 4.2–5.3)
SALICYLATES SERPL-MCNC: 3.7 MG/DL (ref 2.8–20)
SODIUM SERPL-SCNC: 142 MMOL/L (ref 136–145)
SP GR UR REFRACTOMETRY: 1.02 (ref 1–1.03)
TROPONIN I SERPL-MCNC: <0.02 NG/ML (ref 0–0.06)
TSH SERPL DL<=0.05 MIU/L-ACNC: 0.4 UIU/ML (ref 0.36–3.74)
UROBILINOGEN UR QL STRIP.AUTO: 1 EU/DL (ref 0.2–1)
WBC # BLD AUTO: 9.8 K/UL (ref 4.6–13.2)

## 2017-07-24 PROCEDURE — 84443 ASSAY THYROID STIM HORMONE: CPT | Performed by: EMERGENCY MEDICINE

## 2017-07-24 PROCEDURE — 80307 DRUG TEST PRSMV CHEM ANLYZR: CPT | Performed by: EMERGENCY MEDICINE

## 2017-07-24 PROCEDURE — 99282 EMERGENCY DEPT VISIT SF MDM: CPT

## 2017-07-24 RX ORDER — LORAZEPAM 1 MG/1
1 TABLET ORAL
Status: COMPLETED | OUTPATIENT
Start: 2017-07-24 | End: 2017-07-24

## 2017-07-24 RX ORDER — HYDROXYZINE PAMOATE 25 MG/1
25 CAPSULE ORAL
Qty: 10 CAP | Refills: 0 | Status: SHIPPED | OUTPATIENT
Start: 2017-07-24 | End: 2020-10-05

## 2017-07-24 RX ADMIN — LORAZEPAM 1 MG: 1 TABLET ORAL at 16:48

## 2017-07-24 NOTE — ED NOTES
I have reviewed discharge instructions with the patient. The patient verbalized understanding. Patient armband removed and shredded  Current Discharge Medication List      START taking these medications    Details   hydrOXYzine pamoate (VISTARIL) 25 mg capsule Take 1 Cap by mouth every six (6) hours as needed for Anxiety.   Qty: 10 Cap, Refills: 0

## 2017-07-24 NOTE — DISCHARGE INSTRUCTIONS
Anxiety Disorder: Care Instructions  Your Care Instructions  Anxiety is a normal reaction to stress. Difficult situations can cause you to have symptoms such as sweaty palms and a nervous feeling. In an anxiety disorder, the symptoms are far more severe. Constant worry, muscle tension, trouble sleeping, nausea and diarrhea, and other symptoms can make normal daily activities difficult or impossible. These symptoms may occur for no reason, and they can affect your work, school, or social life. Medicines, counseling, and self-care can all help. Follow-up care is a key part of your treatment and safety. Be sure to make and go to all appointments, and call your doctor if you are having problems. It's also a good idea to know your test results and keep a list of the medicines you take. How can you care for yourself at home? · Take medicines exactly as directed. Call your doctor if you think you are having a problem with your medicine. · Go to your counseling sessions and follow-up appointments. · Recognize and accept your anxiety. Then, when you are in a situation that makes you anxious, say to yourself, \"This is not an emergency. I feel uncomfortable, but I am not in danger. I can keep going even if I feel anxious. \"  · Be kind to your body:  ¨ Relieve tension with exercise or a massage. ¨ Get enough rest.  ¨ Avoid alcohol, caffeine, nicotine, and illegal drugs. They can increase your anxiety level and cause sleep problems. ¨ Learn and do relaxation techniques. See below for more about these techniques. · Engage your mind. Get out and do something you enjoy. Go to a funny movie, or take a walk or hike. Plan your day. Having too much or too little to do can make you anxious. · Keep a record of your symptoms. Discuss your fears with a good friend or family member, or join a support group for people with similar problems. Talking to others sometimes relieves stress.   · Get involved in social groups, or volunteer to help others. Being alone sometimes makes things seem worse than they are. · Get at least 30 minutes of exercise on most days of the week to relieve stress. Walking is a good choice. You also may want to do other activities, such as running, swimming, cycling, or playing tennis or team sports. Relaxation techniques  Do relaxation exercises 10 to 20 minutes a day. You can play soothing, relaxing music while you do them, if you wish. · Tell others in your house that you are going to do your relaxation exercises. Ask them not to disturb you. · Find a comfortable place, away from all distractions and noise. · Lie down on your back, or sit with your back straight. · Focus on your breathing. Make it slow and steady. · Breathe in through your nose. Breathe out through either your nose or mouth. · Breathe deeply, filling up the area between your navel and your rib cage. Breathe so that your belly goes up and down. · Do not hold your breath. · Breathe like this for 5 to 10 minutes. Notice the feeling of calmness throughout your whole body. As you continue to breathe slowly and deeply, relax by doing the following for another 5 to 10 minutes:  · Tighten and relax each muscle group in your body. You can begin at your toes and work your way up to your head. · Imagine your muscle groups relaxing and becoming heavy. · Empty your mind of all thoughts. · Let yourself relax more and more deeply. · Become aware of the state of calmness that surrounds you. · When your relaxation time is over, you can bring yourself back to alertness by moving your fingers and toes and then your hands and feet and then stretching and moving your entire body. Sometimes people fall asleep during relaxation, but they usually wake up shortly afterward. · Always give yourself time to return to full alertness before you drive a car or do anything that might cause an accident if you are not fully alert.  Never play a relaxation tape while you drive a car. When should you call for help? Call 911 anytime you think you may need emergency care. For example, call if:  · You feel you cannot stop from hurting yourself or someone else. Keep the numbers for these national suicide hotlines: 6-420-728-TALK (0-561-638-800.912.2537) and 3-905-LVOXGLQ (5-549.427.5007). If you or someone you know talks about suicide or feeling hopeless, get help right away. Watch closely for changes in your health, and be sure to contact your doctor if:  · You have anxiety or fear that affects your life. · You have symptoms of anxiety that are new or different from those you had before. Where can you learn more? Go to http://avrilPenxybrody.info/. Enter P754 in the search box to learn more about \"Anxiety Disorder: Care Instructions. \"  Current as of: July 26, 2016  Content Version: 11.3  © 9220-8518 [employer]. Care instructions adapted under license by [employer] (which disclaims liability or warranty for this information). If you have questions about a medical condition or this instruction, always ask your healthcare professional. [place] [address] any warranty or liability for your use of this information. Palpitations: Care Instructions  Your Care Instructions    Heart palpitations are the uncomfortable sensation that your heart is beating fast or irregularly. You might feel pounding or fluttering in your chest. It might feel like your heart is skipping a beat. Although palpitations may be caused by a heart problem, they also occur because of stress, fatigue, or use of alcohol, caffeine, or nicotine. Many medicines, including diet pills, antihistamines, decongestants, and some herbal products, can cause heart palpitations. Nearly everyone has palpitations from time to time. Depending on your symptoms, your doctor may need to do more tests to try to find the cause of your palpitations.   Follow-up care is a key part of your treatment and safety. Be sure to make and go to all appointments, and call your doctor if you are having problems. It's also a good idea to know your test results and keep a list of the medicines you take. How can you care for yourself at home? · Avoid caffeine, nicotine, and excess alcohol. · Do not take illegal drugs, such as methamphetamines and cocaine. · Do not take weight loss or diet medicines unless you talk with your doctor first.  · Get plenty of sleep. · Do not overeat. · If you have palpitations again, take deep breaths and try to relax. This may slow a racing heart. · If you start to feel lightheaded, lie down to avoid injuries that might result if you pass out and fall down. · Keep a record of your palpitations and bring it to your next doctor's appointment. Write down:  ¨ The date and time. ¨ Your pulse. (If your heart is beating fast, it may be hard to count your pulse.)  ¨ What you were doing when the palpitations started. ¨ How long the palpitations lasted. ¨ Any other symptoms. · If an activity causes palpitations, slow down or stop. Talk to your doctor before you do that activity again. · Take your medicines exactly as prescribed. Call your doctor if you think you are having a problem with your medicine. When should you call for help? Call 911 anytime you think you may need emergency care. For example, call if:  · You passed out (lost consciousness). · You have symptoms of a heart attack. These may include:  ¨ Chest pain or pressure, or a strange feeling in the chest.  ¨ Sweating. ¨ Shortness of breath. ¨ Pain, pressure, or a strange feeling in the back, neck, jaw, or upper belly or in one or both shoulders or arms. ¨ Lightheadedness or sudden weakness. ¨ A fast or irregular heartbeat. After you call 911, the  may tell you to chew 1 adult-strength or 2 to 4 low-dose aspirin. Wait for an ambulance. Do not try to drive yourself.   · You have symptoms of a stroke. These may include:  ¨ Sudden numbness, tingling, weakness, or loss of movement in your face, arm, or leg, especially on only one side of your body. ¨ Sudden vision changes. ¨ Sudden trouble speaking. ¨ Sudden confusion or trouble understanding simple statements. ¨ Sudden problems with walking or balance. ¨ A sudden, severe headache that is different from past headaches. Call your doctor now or seek immediate medical care if:  · You have heart palpitations and:  ¨ Are dizzy or lightheaded, or you feel like you may faint. ¨ Have new or increased shortness of breath. Watch closely for changes in your health, and be sure to contact your doctor if:  · You continue to have heart palpitations. Where can you learn more? Go to http://avril-brody.info/. Enter R508 in the search box to learn more about \"Palpitations: Care Instructions. \"  Current as of: April 3, 2017  Content Version: 11.3  © 9843-9813 [employer]. Care instructions adapted under license by [employer] (which disclaims liability or warranty for this information). If you have questions about a medical condition or this instruction, always ask your healthcare professional. [address] any warranty or liability for your use of this information. Novel SuperTV Activation    Thank you for requesting access to [employer]. Please follow the instructions below to securely access and download your online medical record. [employer] allows you to send messages to your doctor, view your test results, renew your prescriptions, schedule appointments, and more. How Do I Sign Up? 1. In your internet browser, go to [email or web address]  2. Click on the First Time User? Click Here link in the Sign In box. You will be redirect to the New Member Sign Up page. 3. Enter your Novel [employer]TV Access Code exactly as it appears below. You will not need to use this code after youve completed the sign-up process. If you do not sign up before the expiration date, you must request a new code. Uploadcare Access Code: [email or web address]  Expires: 10/6/2017  1:54 PM (This is the date your Uploadcare access code will )    4. Enter the last four digits of your Social Security Number (xxxx) and Date of Birth (mm/dd/yyyy) as indicated and click Submit. You will be taken to the next sign-up page. 5. Create a Uploadcare ID. This will be your Uploadcare login ID and cannot be changed, so think of one that is secure and easy to remember. 6. Create a Uploadcare password. You can change your password at any time. 7. Enter your Password Reset Question and Answer. This can be used at a later time if you forget your password. 8. Enter your e-mail address. You will receive e-mail notification when new information is available in [address] 9. Click Sign Up. You can now view and download portions of your medical record. 10. Click the Download Summary menu link to download a portable copy of your medical information. Additional Information    If you have questions, please visit the Frequently Asked Questions section of the Uploadcare website at [email or web address] Remember, Uploadcare is NOT to be used for urgent needs. For medical emergencies, dial 911.
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
## 2017-07-24 NOTE — IP AVS SNAPSHOT
[address] [facility] Patient: Slava Collier MRN: [id number] [facility]:[birth year] Current Discharge Medication List  
  
START taking these medications Dose & Instructions Dispensing Information Comments Morning Noon Evening Bedtime  
 clonazePAM 0.5 mg tablet Commonly known as:  Toño Bergeron Your last dose was: Your next dose is:    
   
   
 Dose:  0.5 mg Take 1 Tab by mouth two (2) times a day. Max Daily Amount: 1 mg. Indications: PANIC DISORDER Quantity:  60 Tab Refills:  0  
     
   
   
   
  
 furosemide 20 mg tablet Commonly known as:  LASIX Your last dose was: Your next dose is: Take one tablet by mouth every day. Indications: hypertension Quantity:  30 Tab Refills:  0 CONTINUE these medications which have CHANGED Dose & Instructions Dispensing Information Comments Morning Noon Evening Bedtime PARoxetine 40 mg tablet Commonly known as:  PAXIL What changed:  how much to take Your last dose was: Your next dose is:    
   
   
 Dose:  20 mg Take 0.5 Tabs by mouth daily. Indications: ANXIETY WITH DEPRESSION Quantity:  30 Tab Refills:  0 CONTINUE these medications which have NOT CHANGED Dose & Instructions Dispensing Information Comments Morning Noon Evening Bedtime  
 albuterol sulfate 2.5 mg/0.5 mL Nebu nebulizer solution Commonly known as:  PROVENTIL;VENTOLIN Your last dose was: Your next dose is:    
   
   
 by Nebulization route once. Refills:  0  
     
   
   
   
  
 hydrOXYzine pamoate 25 mg capsule Commonly known as:  VISTARIL Your last dose was: Your next dose is:    
   
   
 Dose:  25 mg Take 1 Cap by mouth every six (6) hours as needed for Anxiety. Quantity:  10 Cap Refills:  0 multivitamin tablet Commonly known as:  ONE A DAY Your last dose was: Your next dose is:    
   
   
 Dose:  1 Tab Take 1 Tab by mouth daily. Refills:  0  
     
   
   
   
  
 omeprazole 40 mg capsule Commonly known as:  PRILOSEC Your last dose was: Your next dose is:    
   
   
 Dose:  40 mg Take 40 mg by mouth daily. Refills:  0  
     
   
   
   
  
 simvastatin 40 mg tablet Commonly known as:  ZOCOR Your last dose was: Your next dose is:    
   
   
 Dose:  40 mg Take 1 Tab by mouth nightly. Quantity:  30 Tab Refills:  3 STOP taking these medications HYDROcodone-acetaminophen 5-325 mg per tablet Commonly known as:  Lee Valdez Where to Get Your Medications Information on where to get these meds will be given to you by the nurse or doctor. ! Ask your nurse or doctor about these medications  
  clonazePAM 0.5 mg tablet  
 furosemide 20 mg tablet PARoxetine 40 mg tablet

## 2017-07-24 NOTE — ED TRIAGE NOTES
Patient states : \"My brain is overwhelmed, I can't focus\". Patient states that she has been off her Xanax and can't find a psychologist.  Patient talking at quick pace. She states apologies for excessive talking and inability to focus. Medics state that patient lives at [facility]. Patient states that she is being evicted.

## 2017-07-24 NOTE — IP AVS SNAPSHOT
303 [facility] 
 
 
 [address] [facility] Patient: Susan Barbosa MRN: [id number] BKN:3/41/2033 You are allergic to the following Allergen Reactions Aspirin Nausea and Vomiting Darvocet A500 (Propoxyphene N-Acetaminophen) Drowsiness Midrin (Itnqiii-Vogrpfydy-Zerpbhtvqcul) Drowsiness Neurontin (Gabapentin) Drowsiness Ultram (Tramadol) Nausea Only Codeine Nausea Only Recent Documentation Height Weight Breastfeeding? BMI OB Status Smoking Status 1.651 m 49.9 kg No 18.3 kg/m2 Postmenopausal Current Some Day Smoker Emergency Contacts Name Discharge Info Relation Home Work Mobile English,Vy  Friend [5] [phone] About your hospitalization You were admitted on:  July 25, 2017 You last received care in the:  [facility] ADULT CHEM DEP You were discharged on:  August 1, 2017 Unit phone number:  [phone] Why you were hospitalized Your primary diagnosis was: Anxiety Disorder Due To Medical Condition Your diagnoses also included:  Anxiety, Depression, Bipolar Disorder (Hcc), Benzodiazepine Withdrawal Without Complication (Hcc) Providers Seen During Your Hospitalizations Provider Role Specialty Primary office phone Sandra Johnson MD Attending Provider Emergency Medicine [phone] Felix Veloz MD Attending Provider Emergency Medicine [phone] Nash Haney MD Attending Provider Psychiatry [phone] Your Primary Care Physician (PCP) Primary Care Physician Office Phone Office Fax Aurelia Shay, [address] [place] [address] [phone] Follow-up Information Follow up With Details Comments Contact Info [address] [facility]. On 8/2/2017 Aristides Griggs LCSW on 8/2/17 @ 2pm..(be there 1:30pm to register). . Medication appointments will be made after initial. [address] [facility] [address] 
[phone] Quintin Jimenez MD   [address] [facility] [address] [facility] [address] 
[phone] Pt will return to [address]. .. [facility] [address]. Carlyle Barry Pt will see Junior Coleman LCSW on 8/2/17 @ 2pm..(be there 1:30pm to register). . Medication appointments will be with Dr Baljit Canales #[phone]. . on 8/  
  
  
 
  
  
  
Current Discharge Medication List  
  
START taking these medications Dose & Instructions Dispensing Information Comments Morning Noon Evening Bedtime  
 clonazePAM 0.5 mg tablet Commonly known as:  Layman Sella Your last dose was: Your next dose is:    
   
   
 Dose:  0.5 mg Take 1 Tab by mouth two (2) times a day. Max Daily Amount: 1 mg. Indications: PANIC DISORDER Quantity:  60 Tab Refills:  0  
     
   
   
   
  
 furosemide 20 mg tablet Commonly known as:  LASIX Your last dose was: Your next dose is: Take one tablet by mouth every day. Indications: hypertension Quantity:  30 Tab Refills:  0 CONTINUE these medications which have CHANGED Dose & Instructions Dispensing Information Comments Morning Noon Evening Bedtime PARoxetine 40 mg tablet Commonly known as:  PAXIL What changed:  how much to take Your last dose was: Your next dose is:    
   
   
 Dose:  20 mg Take 0.5 Tabs by mouth daily. Indications: ANXIETY WITH DEPRESSION Quantity:  30 Tab Refills:  0 CONTINUE these medications which have NOT CHANGED Dose & Instructions Dispensing Information Comments Morning Noon Evening Bedtime  
 albuterol sulfate 2.5 mg/0.5 mL Nebu nebulizer solution Commonly known as:  PROVENTIL;VENTOLIN Your last dose was: Your next dose is:    
   
   
 by Nebulization route once. Refills:  0 hydrOXYzine pamoate 25 mg capsule Commonly known as:  VISTARIL Your last dose was: Your next dose is:    
   
   
 Dose:  25 mg Take 1 Cap by mouth every six (6) hours as needed for Anxiety. Quantity:  10 Cap Refills:  0  
     
   
   
   
  
 multivitamin tablet Commonly known as:  ONE A DAY Your last dose was: Your next dose is:    
   
   
 Dose:  1 Tab Take 1 Tab by mouth daily. Refills:  0  
     
   
   
   
  
 omeprazole 40 mg capsule Commonly known as:  PRILOSEC Your last dose was: Your next dose is:    
   
   
 Dose:  40 mg Take 40 mg by mouth daily. Refills:  0  
     
   
   
   
  
 simvastatin 40 mg tablet Commonly known as:  ZOCOR Your last dose was: Your next dose is:    
   
   
 Dose:  40 mg Take 1 Tab by mouth nightly. Quantity:  30 Tab Refills:  3 STOP taking these medications HYDROcodone-acetaminophen 5-325 mg per tablet Commonly known as:  Doni Spur Where to Get Your Medications Information on where to get these meds will be given to you by the nurse or doctor. ! Ask your nurse or doctor about these medications  
  clonazePAM 0.5 mg tablet  
 furosemide 20 mg tablet PARoxetine 40 mg tablet Discharge Instructions BEHAVIORAL HEALTH NURSING DISCHARGE NOTE The following personal items collected during your admission are returned to you:  
Dental Appliance: Dental Appliances: None Vision: Visual Aid: With patient, Glasses Hearing Aid:   
Jewelry: Jewelry: Ring Clothing: Clothing: Dress, Undergarments, Footwear Other Valuables: Other Valuables: Cell Phone, [employer] Valuables sent to safe: Personal Items Sent to Safe:  ($10.00, (2) visa cards,(2) ss cards,medicare card) PATIENT INSTRUCTION The discharge information has been reviewed with the patient. The patient verbalized understanding. Patient armband removed and shredded BEHAVIORAL HEALTH NURSING DISCHARGE NOTE The following personal items collected during your admission are returned to you:  
Dental Appliance: Dental Appliances: None Vision: Visual Aid: With patient, Glasses Hearing Aid:   
Jewelry: Jewelry: Ring Clothing: Clothing: Dress, Undergarments, Footwear Other Valuables: Other Valuables: Cell Phone, [employer] Valuables sent to safe: Personal Items Sent to Safe:  ($10.00, (2) visa cards,(2) ss cards,medicare card) PATIENT INSTRUCTIONS: 
 
 
Follow-up with *** The discharge information has been reviewed with the {PATIENT PARENT GUARDIAN:91159}. The {PATIENT PARENT GUARDIAN:58226} verbalized understanding. Patient {ARMBANDS:20124} Discharge Orders None Introducing [facility]! Ludy Gonzales introduces PEER patient portal. Now you can access parts of your medical record, email your doctor's office, and request medication refills online. 1. In your internet browser, go to [email or web address] 2. Click on the First Time User? Click Here link in the Sign In box. You will see the New Member Sign Up page. 3. Enter your PEER Access Code exactly as it appears below. You will not need to use this code after youve completed the sign-up process. If you do not sign up before the expiration date, you must request a new code. · PEER Access Code: [email or web address] Expires: 10/6/2017  1:54 PM 
 
4. Enter the last four digits of your Social Security Number (xxxx) and Date of Birth (mm/dd/yyyy) as indicated and click Submit. You will be taken to the next sign-up page. 5. Create a PEER ID. This will be your PEER login ID and cannot be changed, so think of one that is secure and easy to remember. 6. Create a PEER password. You can change your password at any time. 7. Enter your Password Reset Question and Answer.  This can be used at a later time if you forget your password. 8. Enter your e-mail address. You will receive e-mail notification when new information is available in [address] 9. Click Sign Up. You can now view and download portions of your medical record. 10. Click the Download Summary menu link to download a portable copy of your medical information. If you have questions, please visit the Frequently Asked Questions section of the [employer] website. Remember, [employer] is NOT to be used for urgent needs. For medical emergencies, dial 911. Now available from your iPhone and Android! General Information Please provide this summary of care documentation to your next provider. Patient Signature:  ____________________________________________________________ Date:  ____________________________________________________________  
  
[facility] Provider Signature:  ____________________________________________________________ Date:  ____________________________________________________________

## 2017-07-25 PROBLEM — F41.0 PANIC DISORDER: Status: ACTIVE | Noted: 2017-07-25

## 2017-07-25 PROBLEM — F32.A DEPRESSION: Status: ACTIVE | Noted: 2017-07-25

## 2017-07-25 LAB
ANION GAP BLD CALC-SCNC: 7 MMOL/L (ref 3–18)
ATRIAL RATE: 74 BPM
BASOPHILS # BLD AUTO: 0.1 K/UL (ref 0–0.06)
BASOPHILS # BLD: 1 % (ref 0–2)
BUN SERPL-MCNC: 18 MG/DL (ref 7–18)
BUN/CREAT SERPL: 30 (ref 12–20)
CALCIUM SERPL-MCNC: 9 MG/DL (ref 8.5–10.1)
CALCULATED P AXIS, ECG09: 68 DEGREES
CALCULATED R AXIS, ECG10: 23 DEGREES
CALCULATED T AXIS, ECG11: 65 DEGREES
CHLORIDE SERPL-SCNC: 104 MMOL/L (ref 100–108)
CO2 SERPL-SCNC: 29 MMOL/L (ref 21–32)
CREAT SERPL-MCNC: 0.61 MG/DL (ref 0.6–1.3)
DIAGNOSIS, 93000: NORMAL
DIFFERENTIAL METHOD BLD: ABNORMAL
EOSINOPHIL # BLD: 0.2 K/UL (ref 0–0.4)
EOSINOPHIL NFR BLD: 2 % (ref 0–5)
ERYTHROCYTE [DISTWIDTH] IN BLOOD BY AUTOMATED COUNT: 12.9 % (ref 11.6–14.5)
ETHANOL SERPL-MCNC: <3 MG/DL (ref 0–3)
GLUCOSE SERPL-MCNC: 99 MG/DL (ref 74–99)
HCT VFR BLD AUTO: 40.7 % (ref 35–45)
HGB BLD-MCNC: 13.4 G/DL (ref 12–16)
LYMPHOCYTES # BLD AUTO: 38 % (ref 21–52)
LYMPHOCYTES # BLD: 2.7 K/UL (ref 0.9–3.6)
MCH RBC QN AUTO: 32.2 PG (ref 24–34)
MCHC RBC AUTO-ENTMCNC: 32.9 G/DL (ref 31–37)
MCV RBC AUTO: 97.8 FL (ref 74–97)
MONOCYTES # BLD: 0.5 K/UL (ref 0.05–1.2)
MONOCYTES NFR BLD AUTO: 7 % (ref 3–10)
NEUTS SEG # BLD: 3.8 K/UL (ref 1.8–8)
NEUTS SEG NFR BLD AUTO: 52 % (ref 40–73)
P-R INTERVAL, ECG05: 132 MS
PLATELET # BLD AUTO: 266 K/UL (ref 135–420)
PMV BLD AUTO: 9 FL (ref 9.2–11.8)
POTASSIUM SERPL-SCNC: 3.4 MMOL/L (ref 3.5–5.5)
Q-T INTERVAL, ECG07: 398 MS
QRS DURATION, ECG06: 96 MS
QTC CALCULATION (BEZET), ECG08: 441 MS
RBC # BLD AUTO: 4.16 M/UL (ref 4.2–5.3)
SODIUM SERPL-SCNC: 140 MMOL/L (ref 136–145)
VENTRICULAR RATE, ECG03: 74 BPM
WBC # BLD AUTO: 7.3 K/UL (ref 4.6–13.2)

## 2017-07-25 PROCEDURE — 85025 COMPLETE CBC W/AUTO DIFF WBC: CPT

## 2017-07-25 PROCEDURE — 80307 DRUG TEST PRSMV CHEM ANLYZR: CPT

## 2017-07-25 PROCEDURE — 80048 BASIC METABOLIC PNL TOTAL CA: CPT

## 2017-07-25 PROCEDURE — 74011250637 HC RX REV CODE- 250/637: Performed by: PSYCHIATRY & NEUROLOGY

## 2017-07-25 PROCEDURE — 36415 COLL VENOUS BLD VENIPUNCTURE: CPT

## 2017-07-25 PROCEDURE — 65220000003 HC RM SEMIPRIVATE PSYCH

## 2017-07-25 RX ORDER — IBUPROFEN 600 MG/1
600 TABLET ORAL
Status: DISCONTINUED | OUTPATIENT
Start: 2017-07-25 | End: 2017-07-27

## 2017-07-25 RX ORDER — METHOCARBAMOL 500 MG/1
750 TABLET, FILM COATED ORAL
Status: CANCELLED | OUTPATIENT
Start: 2017-07-25

## 2017-07-25 RX ORDER — RISPERIDONE 2 MG/1
2 TABLET, FILM COATED ORAL 2 TIMES DAILY
Status: DISCONTINUED | OUTPATIENT
Start: 2017-07-25 | End: 2017-07-26

## 2017-07-25 RX ORDER — CLONIDINE HYDROCHLORIDE 0.1 MG/1
0.1 TABLET ORAL
Status: CANCELLED | OUTPATIENT
Start: 2017-07-25

## 2017-07-25 RX ORDER — THERA TABS 400 MCG
1 TAB ORAL DAILY
Status: CANCELLED | OUTPATIENT
Start: 2017-07-25

## 2017-07-25 RX ORDER — FLUPHENAZINE HYDROCHLORIDE 5 MG/1
5 TABLET ORAL
Status: DISCONTINUED | OUTPATIENT
Start: 2017-07-25 | End: 2017-08-01 | Stop reason: HOSPADM

## 2017-07-25 RX ORDER — PAROXETINE HYDROCHLORIDE 20 MG/1
40 TABLET, FILM COATED ORAL DAILY
Status: DISCONTINUED | OUTPATIENT
Start: 2017-07-25 | End: 2017-07-26

## 2017-07-25 RX ORDER — HYDROXYZINE PAMOATE 50 MG/1
50 CAPSULE ORAL
Status: DISCONTINUED | OUTPATIENT
Start: 2017-07-25 | End: 2017-07-25 | Stop reason: SDUPTHER

## 2017-07-25 RX ORDER — DIVALPROEX SODIUM 250 MG/1
250 TABLET, DELAYED RELEASE ORAL 2 TIMES DAILY
Status: DISCONTINUED | OUTPATIENT
Start: 2017-07-25 | End: 2017-07-26

## 2017-07-25 RX ORDER — PROMETHAZINE HYDROCHLORIDE 25 MG/ML
25 INJECTION, SOLUTION INTRAMUSCULAR; INTRAVENOUS
Status: CANCELLED | OUTPATIENT
Start: 2017-07-25

## 2017-07-25 RX ORDER — PROMETHAZINE HYDROCHLORIDE 25 MG/1
25 TABLET ORAL
Status: CANCELLED | OUTPATIENT
Start: 2017-07-25

## 2017-07-25 RX ORDER — PAROXETINE HYDROCHLORIDE 20 MG/1
40 TABLET, FILM COATED ORAL DAILY
Status: DISCONTINUED | OUTPATIENT
Start: 2017-07-25 | End: 2017-07-25

## 2017-07-25 RX ORDER — THERA TABS 400 MCG
1 TAB ORAL DAILY
Status: DISCONTINUED | OUTPATIENT
Start: 2017-07-26 | End: 2017-08-01 | Stop reason: HOSPADM

## 2017-07-25 RX ORDER — FLUPHENAZINE HYDROCHLORIDE 2.5 MG/ML
5 INJECTION, SOLUTION INTRAMUSCULAR
Status: DISCONTINUED | OUTPATIENT
Start: 2017-07-25 | End: 2017-08-01 | Stop reason: HOSPADM

## 2017-07-25 RX ORDER — ALPRAZOLAM 0.5 MG/1
0.5 TABLET ORAL
Status: DISCONTINUED | OUTPATIENT
Start: 2017-07-25 | End: 2017-07-30

## 2017-07-25 RX ORDER — SIMVASTATIN 10 MG/1
40 TABLET, FILM COATED ORAL
Status: DISCONTINUED | OUTPATIENT
Start: 2017-07-25 | End: 2017-08-01 | Stop reason: HOSPADM

## 2017-07-25 RX ORDER — TRAZODONE HYDROCHLORIDE 50 MG/1
50 TABLET ORAL
Status: DISCONTINUED | OUTPATIENT
Start: 2017-07-25 | End: 2017-08-01 | Stop reason: HOSPADM

## 2017-07-25 RX ADMIN — HYDROXYZINE PAMOATE 50 MG: 50 CAPSULE ORAL at 10:22

## 2017-07-25 RX ADMIN — SIMVASTATIN 40 MG: 10 TABLET, FILM COATED ORAL at 22:29

## 2017-07-25 NOTE — BH NOTES
Patient has been in the milieu back and forth to the desk non stop all shift. Patient continues to apologize and then say she doesn't know what she is supposed to be doing. Patient has been waiting for her scheduled medications all day and asks al least every 30 minutes for them. Patient admits to going to [facility] ED yesterday because she could not get a prescription for Xanax and patient states that the people in the ER told her she needed to say certain things to get in here so she said that she was suicidal but patient states that she was never suicidal. Patient has been reassured that she will get a follow up appointment scheduled for her prior to discharge and patient would be able to continue to get her scheduled medications. Patient appears anxious and continues to apologize for interrupting, yet continues to interrupt. Patient denies SI/HI and AVH.

## 2017-07-25 NOTE — BH NOTES
Patient admitted to [facility] unit; report given to Ritu Obrien RN from [address] Patient states she has been on xanax for 20 years, and is requesting to be followed by a psychiatrist for anxiety and panic. Patient states she feels disorganized at times and has panic. Patient denies any suicidal thoughts. Patient used restroom and went on to bed. Declined prn medication when offered. Patient has been sleeping soundly in bed.
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
## 2017-07-25 NOTE — BSMART NOTE
SW Contact:  Pt mostly cooperative during initial contact. Very anxious & feels overwhelmed she will not be able to manage her moods, especially her anxiety without her xanax. .. Shared hx of med mgmt most help with therapy minimal depending on therapist... Outpt sees Dr Nathaniel Goodman. . Reviewed some basic CBT principles to manage her moods better with some handouts given.

## 2017-07-25 NOTE — ED NOTES
Pt en route to  [facility] ED. Report given to [facility]. Pt affect remains calm/cooperative at this time. Pt belongings given to EMS: 1 purse, 1 gown, 1 pair flip flops, $10, 4 bottles of medications. Pt wearing 1 ring and carrying her cellular phone.

## 2017-07-25 NOTE — H&P
Psychiatry History and Physical    Subjective:     Date of Evaluation:  7/25/2017    Reason for Referral:  Joseph Bonner was referred to the examiners from ER/MV for HI. History of Presenting Problem: 61Y/O C female in NAD well developed and nourished. Reports she is not Suicidal or homicidal. Medical problems; Depression, GERD, KEVIN, Headaches, Insomnia, Chronic pain-UDS + Benzos and Opiates    Patient Active Problem List    Diagnosis Date Noted    Panic disorder 07/25/2017    Depression 07/25/2017    Abdominal pain, other specified site 03/14/2013    Unspecified constipation 03/14/2013    GERD (gastroesophageal reflux disease) 10/01/2010    Anxiety 08/26/2010    Headache disorder 08/26/2010    Grief reaction 08/26/2010    Pure hypercholesterolemia 08/26/2010    Insomnia 08/26/2010     Past Medical History:   Diagnosis Date    Anxiety 8/26/2010    GERD (gastroesophageal reflux disease)     Grief reaction 8/26/2010    Headache     Headache disorder 8/26/2010    Hypercholesterolemia     Pure hypercholesterolemia 8/26/2010    Walking pneumonia      Past Surgical History:   Procedure Laterality Date    HX COLONOSCOPY         Family History   Problem Relation Age of Onset    Hypertension Father     Elevated Lipids Father     Heart Disease Father       Social History   Substance Use Topics    Smoking status: Current Some Day Smoker     Packs/day: 0.25     Years: 30.00    Smokeless tobacco: Never Used    Alcohol use No     Prior to Admission medications    Medication Sig Start Date End Date Taking? Authorizing Provider   HYDROcodone-acetaminophen (NORCO) 5-325 mg per tablet Take 1 Tab by mouth every four (4) hours as needed for Pain. Max Daily Amount: 6 Tabs. 7/8/17  Yes Brigitte Garcia PA-C   PARoxetine (PAXIL) 40 mg tablet Take 40 mg by mouth daily. Yes Earline Helton MD   albuterol sulfate (PROVENTIL;VENTOLIN) 2.5 mg/0.5 mL nebu nebulizer solution by Nebulization route once.    Yes Phys Other, MD   multivitamin (ONE A DAY) tablet Take 1 Tab by mouth daily. Yes Phys Other, MD   omeprazole (PRILOSEC) 40 mg capsule Take 40 mg by mouth daily. Yes Phys Other, MD   simvastatin (ZOCOR) 40 mg tablet Take 1 Tab by mouth nightly. 8/26/10  Yes Joseph Quintero MD   hydrOXYzine pamoate (VISTARIL) 25 mg capsule Take 1 Cap by mouth every six (6) hours as needed for Anxiety.  7/24/17   Mary Ellen Gaviria NP     Allergies   Allergen Reactions    Aspirin Nausea and Vomiting    Darvocet A500 [Propoxyphene N-Acetaminophen] Drowsiness    Midrin [Eabjyoe-Qculhhquv-Zypiezvjyipg] Drowsiness    Neurontin [Gabapentin] Drowsiness    Ultram [Tramadol] Nausea Only    Codeine Nausea Only        Review of Systems - History obtained from chart review and the patient  General ROS: positive for  - sleep disturbance  Psychological ROS: positive for - depression and sleep disturbances  Respiratory ROS: no cough, shortness of breath, or wheezing  Cardiovascular ROS: positive for - chest pain  Gastrointestinal ROS: no abdominal pain, change in bowel habits, or black or bloody stools  Genito-Urinary ROS: no dysuria, trouble voiding, or hematuria  Musculoskeletal ROS: positive for - muscle pain  Neurological ROS: no TIA or stroke symptoms  Dermatological ROS: negative      Objective:     Patient Vitals for the past 8 hrs:   BP Temp Pulse Resp   07/25/17 0255 153/79 97.3 °F (36.3 °C) 68 18       Mental Status exam: WNL except for    Sensorium  oriented to time, place and person   Orientation person, place, time/date and situation   Relations cooperative   Eye Contact appropriate   Appearance:  age appropriate and older than stated age   Motor Behavior:  gait stable and within normal limits   Speech:  normal volume   Vocabulary average articulate   Thought Process: logical   Thought Content free of delusions and free of hallucinations   Suicidal ideations no plan  and no intention   Homicidal ideations no plan  and no intention Mood:  depressed   Affect:  depressed   Memory recent  adequate   Memory remote:  adequate   Concentration:  adequate   Abstraction:  concrete   Insight:  good   Reliability good   Judgment:  fair           Physical Exam:   Visit Vitals    /79 (BP 1 Location: Left arm, BP Patient Position: Sitting)    Pulse 68    Temp 97.3 °F (36.3 °C)    Resp 18    Ht 5' 5\" (1.651 m)    Wt 49.9 kg (110 lb)    SpO2 99%    Breastfeeding No    BMI 18.3 kg/m2     General appearance: alert, cooperative, no distress, appears stated age  Head: Normocephalic, without obvious abnormality, atraumatic  Eyes: negative  Ears: normal TM's and external ear canals AU  Nose: Nares normal. Septum midline. Mucosa normal. No drainage or sinus tenderness. Throat: Lips, mucosa, and tongue normal. Teeth and gums normal and abnormal findings: dentition: poor  Neck: supple, symmetrical, trachea midline, no adenopathy, thyroid: not enlarged, symmetric, no tenderness/mass/nodules, no carotid bruit and no JVD  Back: symmetric, no curvature. ROM normal. No CVA tenderness. Lungs: clear to auscultation bilaterally  Heart: regular rate and rhythm, S1, S2 normal, no murmur, click, rub or gallop  Abdomen: soft, non-tender.  Bowel sounds normal. No masses,  no organomegaly  Extremities: extremities normal, atraumatic, no cyanosis or edema  Pulses: 2+ and symmetric  Skin: Skin color, texture, turgor normal. No rashes or lesions  Lymph nodes: Cervical, supraclavicular, and axillary nodes normal.  Neurologic: Grossly normal        Impression:    Active Problems:    Anxiety (8/26/2010)      Overview: New Psychiatrist: Dr. Carol Brennan  (previously Dr. Mercedes Cano)      Changed her Klonopin to Xanax 1 mg qid      Panic disorder (7/25/2017)      Depression (7/25/2017)          Plan:     Recommendations for Treatment/Conditions:  Psychiatric treatment recommended while in hospital  Admit to Psych services for HI Depression                                                  Polysubstance abuse    Referral To: Inpatient psychiatric care    Competency Statement: At the current time, the patient is competent to make informed consent regarding their current medical care and discharge planning and/or financial decisions.       [employer], [place]   7/25/2017 8:50 AM

## 2017-07-25 NOTE — ED NOTES
Pt resting quietly in bed; rouses easily to presence. Affect calm, respirations regular/non labored. Denies need for food/fluids. Plan of care for admission to [facility] ED for further evaluation discussed; pt voiced her understanding. No distress noted.

## 2017-07-25 NOTE — ROUTINE PROCESS
TRANSFER - OUT REPORT:    Verbal report given to Raul Stephen RN(name) on Selina Morrow  being transferred to [facility] ED(unit) for routine progression of care for crisis evaluation of anxiety and homicidal ideation. Report consisted of patients Situation, Background, Assessment and   Recommendations(SBAR) including ongoig 1:1 monitoring for SAD scale of 7. Information from the following report(s) ED Summary was reviewed with the receiving nurse. Lines:       Opportunity for questions and clarification was provided.       Patient transported with:  EMS staff

## 2017-07-25 NOTE — ED NOTES
Patient transferred from her review for crisis evaluation. Would see the patient upon arrival here at [facility] and she was started being seen by crisis    Wyatt Hines MD     1:31 AM pt Patient seen she states she has been on Xanax for a long period of time and ever since her primary stopped that she has been feeling fused and having trouble processing thoughts. She was seen by crisis who decided to admit her.      Wyatt Hines MD

## 2017-07-25 NOTE — BSMART NOTE
ART THERAPY GROUP PROGRESS NOTE    PATIENT SCHEDULED FOR GROUP AT: 14:15    ATTENDANCE: 1/4    PARTICIPATION LEVEL: Does not engage in the art process or gives up easily. ATTENTION LEVEL: Unable to attend to task at hand. FOCUS: Goals     SYMBOLIC & THEMATIC CONTENT AS NOTED IN IMAGERY: She joined the last 10 minutes during group discussion and kept to herself until directly prompted. She presented to be confused and uncertain of her reason for admission. She was educated on the purpose of treatment and overall mental stability, and encouraged to seek staff with concerns or questions.

## 2017-07-25 NOTE — BH NOTES
GROUP THERAPY PROGRESS NOTE    Joseph Bonner was encouraged by staff but refused to participate in  Community.
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
## 2017-07-25 NOTE — ED NOTES
Pt room placement to room 7. Pt calm, ambulatory with regular/steady gait. Sitting calmly on side of bed with no distress noted. Awaiting transportation.
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
## 2017-07-26 PROCEDURE — 74011250637 HC RX REV CODE- 250/637: Performed by: PSYCHIATRY & NEUROLOGY

## 2017-07-26 PROCEDURE — 65220000003 HC RM SEMIPRIVATE PSYCH

## 2017-07-26 RX ORDER — PAROXETINE HYDROCHLORIDE 20 MG/1
10 TABLET, FILM COATED ORAL DAILY
Status: DISCONTINUED | OUTPATIENT
Start: 2017-07-27 | End: 2017-07-30

## 2017-07-26 RX ORDER — DIVALPROEX SODIUM 250 MG/1
500 TABLET, DELAYED RELEASE ORAL 2 TIMES DAILY
Status: DISCONTINUED | OUTPATIENT
Start: 2017-07-27 | End: 2017-07-30

## 2017-07-26 RX ADMIN — RISPERIDONE 2 MG: 2 TABLET ORAL at 20:28

## 2017-07-26 RX ADMIN — IBUPROFEN 600 MG: 600 TABLET, FILM COATED ORAL at 16:12

## 2017-07-26 RX ADMIN — DIVALPROEX SODIUM 250 MG: 250 TABLET, DELAYED RELEASE ORAL at 20:27

## 2017-07-26 RX ADMIN — THERA TABS 1 TABLET: TAB at 08:09

## 2017-07-26 RX ADMIN — RISPERIDONE 2 MG: 2 TABLET ORAL at 08:09

## 2017-07-26 RX ADMIN — SIMVASTATIN 40 MG: 10 TABLET, FILM COATED ORAL at 20:28

## 2017-07-26 RX ADMIN — DIVALPROEX SODIUM 250 MG: 250 TABLET, DELAYED RELEASE ORAL at 08:09

## 2017-07-26 RX ADMIN — ALPRAZOLAM 0.5 MG: 0.5 TABLET ORAL at 08:09

## 2017-07-26 NOTE — PROGRESS NOTES
NUTRITION    Nutrition Screen      RECOMMENDATIONS / PLAN:     - Start Ensure BID. NUTRITION DIAGNOSIS & INTERVENTIONS:     [x] Meals/Snacks: general/healthful diet  [x] Medical food supplementation     Nutrition Diagnosis:   Inadequate oral intake related to poor appetite as evidenced by po intake not meeting estimated needs. ASSESSMENT:      Pt states she is not eating very well, not hungry. Denies recent weight loss, states she has always been thin and walks a lot. Average intake adequate to meet patients estimated nutritional needs:   [] Yes     [x] No      [] Unable to determine at this time    Diet: DIET REGULAR    Food Allergies: no known food allergies  Current Appetite:   [] Good     [] Fair     [x] Poor     [] Other:  Appetite/meal intake prior to admission:   [] Good     [x] Fair     [] Poor     [] Other:   Feeding Limitations:  [] Swallowing Difficulty       [] Chewing Difficulty       [] Other   Current Meal Intake: No data found. Gastrointestinal Issues:  [] Yes    [x] No   Skin Integrity:  WDL    Pertinent Medications:  Reviewed. theragran   Labs:  Reviewed      Anthropometrics:  Ht Readings from Last 1 Encounters:   07/24/17 5' 5\" (1.651 m)       Last 3 Recorded Weights in this Encounter    07/24/17 1926   Weight: 49.9 kg (110 lb)       Body mass index is 18.3 kg/(m^2).       Weight History:   Weight Metrics 7/24/2017 7/24/2017 7/8/2017 3/23/2017 2/12/2017 2/10/2017 1/19/2017   Weight 110 lb 110 lb 122 lb 118 lb 117 lb 115 lb 115 lb   BMI 18.3 kg/m2 18.3 kg/m2 23.83 kg/m2 19.05 kg/m2 18.88 kg/m2 18.01 kg/m2 18.01 kg/m2       Admitting Diagnosis: Depression  Panic disorder  Anxiety  Past Medical History:   Diagnosis Date    Anxiety 8/26/2010    GERD (gastroesophageal reflux disease)     Grief reaction 8/26/2010    Headache     Headache disorder 8/26/2010    Hypercholesterolemia     Pure hypercholesterolemia 8/26/2010    Walking pneumonia         Education Needs:        [x] None identified  [] Identified - Not appropriate at this time  []  Identified and addressed - refer to education log  Learning Limitations:   [x] None identified  [] Identified    Cultural, Voodoo & ethnic food preferences identified:  [x] None    [] Yes      ESTIMATED NUTRITION NEEDS:     2512-1835 kcal (MSJx1.2-1.3),  40-50 gm protein (0.8-1 gm/kg), 1 mL/kcal  Based on:  50 kg      [x] Actual BW      [] IBW          MONITORING & EVALUATION:     Nutrition Goal(s):   1. Po intake of meals will meet >75% of patient estimated nutritional needs within the next 7 days.   Outcome:   [] Met    []  Not Met   [x] New/Initial Goal    Monitor:  [x] Meal intake    [x] Diet tolerance    [x] Supplement intake     Previous Recommendations (for follow-up assessments only):     []   Implemented       []   Not Implemented (RD to address)    [x] No Recommendation Made      Discharge Planning: regular diet   [x]  Participated in care planning, discharge planning, & interdisciplinary rounds as appropriate      Alem Gomez, [address] [place] [address]   Pager: [phone]

## 2017-07-26 NOTE — BH NOTES
Patient came to desk asking when SW will see her. Pt could not elaborate reason or let nurse help when offered assistance, states she is confused and do not know. States she was not seen yesterday. SW was called and informed,he states to let pt know he went in to see her yesterday but was in bed, and will see her tomorrow, another SW covering can assist if needed. Doctor to see patient. Will discuss with pt. Pt in bed resting.

## 2017-07-26 NOTE — BH NOTES
GROUP THERAPY PROGRESS NOTE    Philomena Fountain is not  participating in Recreational Therapy or community . Staff encouraged and pt refused.

## 2017-07-26 NOTE — BH NOTES
Pt continuously approached the desk regarding her night time medication. Pt requested this writer to  contact the on call doctor regarding her medication. Pt stated \"I need my medication. \" This writer contacted the on call doctor regarding pt's request. On call doctor gave order to order pt's medication. Medication ordered and conveyed to pt. Pt came back to desk later inquiring about her medication. This writer explained to the pt that the medication would be given at the appropriate  time. This writer attempted to administer the pt her medication and the pt refused. Pt stated she doesn't want to take the medication because she is not sure what it is going to do. This writer explained the medication indications and pt continued to refused.

## 2017-07-26 NOTE — BH NOTES
Pt has spent the majority of the shift back and forth at the nurse's desk, asking questions about her medications. Pt was pleasant upon approach, but not very talkative concerning her day. Pt did not have visitors this shift. Pt ate the majority of her dinner meal and 100% of her snack. Pt is utilizing non-skid footwear and is free of falls. Pt denies SI, HI, AH, and VH. Pt contacts for safety on the unit. Will continue to monitor.

## 2017-07-26 NOTE — BH NOTES
Ned Alvarado is not participating in Recreational Therapy.      Group time: 1098    Personal goal for participation:  Watching a Movie with peers    Goal orientation: social    Group therapy participation: refuse    Therapeutic interventions reviewed and discussed: Staff encouraged Pt to participate in a social gathering    Impression of participation: Pt  Refuse, chose to rest in bed despite staff encouragement

## 2017-07-26 NOTE — PROGRESS NOTES
Problem: Suicide/Homicide (Adult/Pediatric)  Goal: *STG: Remains safe in hospital  Outcome: Progressing Towards Goal  Pt remains safe. Goal: *STG: Attends activities and groups  Patient will attend at least 2 activities every day throughout hospital stay. Outcome: Not Progressing Towards Goal  Patient has not attended groups today. Problem: Falls - Risk of  Goal: *Absence of falls  Patient will be free of falls every shift throughout hospital stay. Outcome: Progressing Towards Goal  Patient remains free of falls. Comments:   Patient has been back and forth to the desk continually today apologizing for bothering us but continuing to interrupt to ask if she has any medications ordered now. Patient has been redirected continually and continues to come back and forth to desk. Patient seems to not remember anything said prior and seems to not be able to function at all. Patient denies SI?HI and AVH. Patient is able to contract for safety.

## 2017-07-26 NOTE — BH NOTES
GROUP THERAPY PROGRESS NOTE    Krishan Ohara is not participating in Recreational Therapy.      Group time: 60 min    Personal goal for participation: n/a    Goal orientation: n/a    Group therapy participation: Declined attendance    Therapeutic interventions reviewed and discussed: n/a    Impression of participation: Declined

## 2017-07-26 NOTE — BSMART NOTE
ACTIVITIES THERAPY PROGRESS NOTE    Group time:1530    The patient declined group. In bed. Keya Portillo

## 2017-07-27 PROCEDURE — 74011250637 HC RX REV CODE- 250/637: Performed by: PSYCHIATRY & NEUROLOGY

## 2017-07-27 PROCEDURE — 65220000003 HC RM SEMIPRIVATE PSYCH

## 2017-07-27 RX ORDER — FUROSEMIDE 20 MG/1
20 TABLET ORAL DAILY
Status: DISCONTINUED | OUTPATIENT
Start: 2017-07-28 | End: 2017-08-01 | Stop reason: HOSPADM

## 2017-07-27 RX ORDER — PANTOPRAZOLE SODIUM 40 MG/1
40 TABLET, DELAYED RELEASE ORAL
Status: DISCONTINUED | OUTPATIENT
Start: 2017-07-28 | End: 2017-08-01 | Stop reason: HOSPADM

## 2017-07-27 RX ORDER — ACETAMINOPHEN 325 MG/1
650 TABLET ORAL
Status: DISCONTINUED | OUTPATIENT
Start: 2017-07-27 | End: 2017-08-01 | Stop reason: HOSPADM

## 2017-07-27 RX ADMIN — ACETAMINOPHEN 650 MG: 325 TABLET ORAL at 18:04

## 2017-07-27 RX ADMIN — ALPRAZOLAM 0.5 MG: 0.5 TABLET ORAL at 18:09

## 2017-07-27 RX ADMIN — RISPERIDONE 3 MG: 1 TABLET ORAL at 08:29

## 2017-07-27 RX ADMIN — ALPRAZOLAM 0.5 MG: 0.5 TABLET ORAL at 12:45

## 2017-07-27 RX ADMIN — THERA TABS 1 TABLET: TAB at 08:29

## 2017-07-27 RX ADMIN — DIVALPROEX SODIUM 500 MG: 250 TABLET, DELAYED RELEASE ORAL at 08:29

## 2017-07-27 RX ADMIN — RISPERIDONE 3 MG: 1 TABLET ORAL at 21:04

## 2017-07-27 RX ADMIN — SIMVASTATIN 40 MG: 10 TABLET, FILM COATED ORAL at 21:04

## 2017-07-27 RX ADMIN — ALPRAZOLAM 0.5 MG: 0.5 TABLET ORAL at 04:38

## 2017-07-27 RX ADMIN — PAROXETINE HYDROCHLORIDE 10 MG: 20 TABLET, FILM COATED ORAL at 08:29

## 2017-07-27 NOTE — BSMART NOTE
ACTIVITIES THERAPY PROGRESS NOTE    Group time:0550    The patient declined group. Stated it was due to her medications not being right.

## 2017-07-27 NOTE — BH NOTES
Pt. has been confused pacing in and out of her room and forgetting what she wants to say. Staff encouraged pt. to take hr time and focus on what she wants to say to staff. \"Okay. \"  Pt. denies suicidal/homicidal ideations, audio/visual hallucination. Pt contracts for safety on the unit agree to come to staff if feeling harm to self or others. Pt.denies any new medical/pain complaints. Pt. did not have any visitors. Staff encouraged Pt. to  participate in treatment,  medication and group therapy. Pt agreed. Pt. remain free of falls and provided non skid socks. Staff will continue to monitor Pt. for behavior safety and location.

## 2017-07-27 NOTE — BH NOTES
7/26/2017      ChrisLois Dimas A      Current Diagnosis: F30.2 Manic episode with psychotic features    Report from the nursing staff changes in the medical condition while patient has been on the unit:              Recent Results    No results found for this or any previous visit (from the past 24 hour(s)). Sleep: shows no evidence of impairment. Interval History:    Ms. Eliezer Stanley is a 66 y/o  female with a history of self reported severe depression, severe anxiety, had been on Xanax for over 25 years, panic disorder. Patient presented to [facility] ED, disorganized, rambling, stated that she was taken off her Xanax and since has experience mental disorganization, stating she is forgetful, cant seem to manage her affair at home. Patient is requesting to be revaluated by a psychiatrist. Patient espoused vague suicidal ideation and homicidal ideation, no plan, no specific person respectively. She was admitted voluntarily to the hospital, sent from [facility]. Patient was seen today by treatment team.  Overnight per nursing report patient continues to be confused, waking up to the nursing station, requesting help but cannot decipher what help she needs. Patient stated she was confused and cant think straight. She requested medication changes, but does not have a clue as to what she wants changed. Today patient was confused, agitated and anxious. Her thoughts were disorganized, perseverant on medications, benzodiapines, and narcotics. Patient attempted to dictate which medications she needed and to state she was worse off today than when she came in yesterday. She scoffed at the idea of Risperdal and Depakote, saying this slows her down, makes her sleepy and more confused. I try to reassure the patient that she presents with mood lability akin to abad, that we need to stabilize her mood, and we are doing such a thing.  I reaffirmed that I will not be supplying her with Benzos and narcotics , that this combination was not advisable. Patient continues to be medication seeking, anxious, irrational, forgetful with very poor insight, judgment and frustration tolerance. At this time she denies depression, admits to anxiety, denies SI/HI/AVH. Patients speech is rapid and tangential, difficult to interrupt. I question how much sleep she is actually getting, although she says she sleeps a lot. The team will continue to monitor his progress, response to treatment as we clarify her diagnosis, titrate her medications, and identify resources in the community bases on patients needs. Patient stated she has a friend Ms. Sharri Zacarias who knows her very well. We will try to reach out to her friend with her permission to get an idea of her baseline, she asked that we call [facility] [phone]. Mental Status Exam: anxious/paranoid/agitated/irate; affect flat I/J remain limited      Treatment Plan:  -continue admission to inpatient psychiatric unit for safety, treatment and stabilization  -safety precautions  -monitor for withdrawal of opiates and for benzodiazepines. -will start Risperdal 2 mg po bid for mood stability and for psychotic symptoms, titrate to 3 mg po bid  -will continue Xanax 0.5 mg po qid prn  -will start Depakote  mg po bid for mood stability, titrate to 500 mg po bid  -will check [place] Prescription monitoring program for abuse potential and doctor shopping  -will encourage patient to attend therapeutic group activities, may need substance abuse treatment  and education  -we has started Paxil 30 mg daily but reduced this dose to Paxil 10 mg po daily  -will assess with SW for needed services and to aid with disposition  -we will get consent to call Ms. Sharri Zacarias her friend in the community to get idea of baseline    [phone]        ELOS: 3-7 days     Disposition: Patient has lived at a [facility] for 9 years, will likely want to return to same or similar living situation Attending Physician:               Karen Swanson MD      07/26/17

## 2017-07-27 NOTE — PROGRESS NOTES
Problem: Suicide/Homicide (Adult/Pediatric)  Goal: *STG: Remains safe in hospital  AEB remaining safe daily while hospitalized. Outcome: Progressing Towards Goal  Patient remains safe. Goal: *STG/LTG: Complies with medication therapy  Patient will take prescribed medication every day throughout hospital stay. Outcome: Progressing Towards Goal  Patient takes medications as ordered. Problem: Falls - Risk of  Goal: *Absence of falls  Patient will be free of falls every shift throughout hospital stay. Outcome: Progressing Towards Goal  Patient remains free of falls daily. Comments:   Patient has been much calmer today. Patient still approached the nurse's station several times and apologizes but not nearly as frequently as she had been. Patient was confused about her medications this morning, again saying she doesn't take Depakote and Risperidal. Patient was assured that the medications seemed to be making a difference and she finally agreed to take the medicines this morning. Patient is concerned because she feels tired and patient was reassured that any new medication needs time in order for her to get used to it. Patient has been sitting in the milieu quietly for quite a while this morning. Patient appears calmer and has not been pacing today since first thing this morning. Patient continues to deny SI/HI and AVH.

## 2017-07-27 NOTE — BH NOTES
GROUP THERAPY PROGRESS NOTE    [place] is not participating in [employer]. Group time: 30 minutes    Personal goal for participation:  Positive changes    Goal orientation: passive    Group therapy participation: refuse    Therapeutic interventions reviewed and discussed: Staff encouraged Pt. To think positive    Impression of participation: Pt.  Refuse, chose to rest in bed despite staff encouragement

## 2017-07-27 NOTE — BSMART NOTE
SW Contact:  Pt gave permission to contact her friend Chantel Galeana #[phone]. . But later changed mind. . Kept obsessing some about prior Dr's allowing her to take xanax & struggles with. . How life will be without it ? Outpt wants to try to get back to [address] [place] [address] since her Dr retired. .. Given goal sheets to work on.

## 2017-07-27 NOTE — BH NOTES
GROUP THERAPY PROGRESS NOTE    Selina Morrow is participating in [place].      Group time: 30 minutes    Personal goal for participation: discuss daily Tx goal(s), discuss unit issues and guideline compliance    Goal orientation: personal    Group therapy participation: minimal

## 2017-07-27 NOTE — BH NOTES
7/27/2017     Deni Perez     Current Diagnosis: F30.2 Manic episode with psychotic features     Report from the nursing staff changes in the medical condition while patient has been on the unit:          Recent Results    No results found for this or any previous visit (from the past 24 hour(s)).         Sleep: shows no evidence of impairment.     Interval History:    Ms. Lillian Mcknight is a 66 y/o  female with a history of self reported severe depression, severe anxiety, had been on Xanax for over 25 years, panic disorder. Patient presented to [facility] ED, disorganized, rambling, stated that she was taken off her Xanax and since has experience mental disorganization, stating she is forgetful, cant seem to manage her affair at home. Patient is requesting to be revaluated by a psychiatrist. Patient espoused vague suicidal ideation and homicidal ideation, no plan, no specific person respectively. She was admitted voluntarily to the hospital, sent from [facility].   Patient was seen today by treatment team. Patient seem to have slowed down a bit today, less anxious than yesterday, still confused and irrational but less so. Nursing remarked that she see an improvement in behavior with less trips to the nursing station asking for help, not remembering what type of help she needs. Patient maintains that she is confused, this is not her baseline, that we don't understand her. She stated erroneously that she was doing better when she first came to the hospital, now feels all she does is sleep, she also complained of drug cotton mouth. She requested medication changes, wanted Benzos and her narcotics back, stating she was told by her past psychiatrist that she has a chemical imbalance, and absolutely needed Xanax. She was reassured that such talk of a chemical imbalance was an oversimplification of her psychiatric problems, and she did not absolutely require Xanax.  We are prescribing other medications to keep her mood and thoughts balanced. Patient continues to be medication seeking, anxious, irrational, forgetful with very poor insight, judgment and frustration tolerance. At this time she denies depression, admits to anxiety, denies SI/HI/AVH. Patients speech is rapid and tangential, difficult to interrupt. I question how much sleep she is actually getting, although she says she sleeps a lot. The team will continue to monitor his progress, response to treatment as we clarify her diagnosis, titrate her medications, and identify resources in the community bases on patients needs. Patient stated she has a friend Ms. Neila Schirmer who knows her very well. We will try to reach out to her friend with her permission to get an idea of her baseline, she asked that we call Heid [phone].       Mental Status Exam: anxious/paranoid/agitated/irate; affect flat I/J remain limited     Treatment Plan:  -continue admission to inpatient psychiatric unit for safety, treatment and stabilization  -safety precautions  -monitor for withdrawal of opiates and for benzodiazepines. -will start Risperdal 2 mg po bid for mood stability and for psychotic symptoms, titrate to 3 mg po bid  -will continue Xanax 0.5 mg po qid prn  -will start Depakote  mg po bid for mood stability, titrate to 500 mg po bid  -will check [place] Prescription monitoring program for abuse potential and doctor shopping  -will encourage patient to attend therapeutic group activities, may need substance abuse treatment  and education  -we has started Paxil 30 mg daily but reduced this dose to Paxil 10 mg po daily as this may promote abad  -will assess with KIMBERLY for needed services and to aid with disposition  -we will get consent to call Ms. Neila Schirmer her friend in the community to get idea of baseline    [phone] ( Patient later stated to KIMBERLY buchanan that she was just putting that name out, this lady was not a friend but an acquaintance, stating we can call or not call, doesn't make a difference to her!)        ELOS: 3-7 days     Dis[facility]: [facility], will likely want to return to same or similar living situation.  We are looking at a discharge by the latest Wednesday of next week.        Attending [facility] Claudio Burden MD      07/27/17

## 2017-07-27 NOTE — BH NOTES
GROUP THERAPY PROGRESS NOTE    Ned Alvarado is not participating in [employer].      Group time: 30 shanna    Personal goal for participation: n/a    Goal orientation: social    Group therapy participation: refusal    Therapeutic interventions reviewed and discussed: Positive social interaction and leisure recreation    Impression of participation: refusal

## 2017-07-27 NOTE — BH NOTES
Martin Diamond is not participating in Substance abuse. Group time: 1 hour    Personal goal for participation: Educate on Substance abuse    Goal orientation: passive    Group therapy participation: refuse    Therapeutic interventions reviewed and discussed:  Staff encouraged Pt. To participate in group    Impression of participation: Pt. Refuse chose to rest in bed despite staff encouragement.

## 2017-07-28 PROCEDURE — 74011250637 HC RX REV CODE- 250/637: Performed by: PSYCHIATRY & NEUROLOGY

## 2017-07-28 PROCEDURE — 65220000003 HC RM SEMIPRIVATE PSYCH

## 2017-07-28 RX ADMIN — PAROXETINE HYDROCHLORIDE 10 MG: 20 TABLET, FILM COATED ORAL at 09:23

## 2017-07-28 RX ADMIN — ALPRAZOLAM 0.5 MG: 0.5 TABLET ORAL at 12:04

## 2017-07-28 RX ADMIN — FUROSEMIDE 20 MG: 20 TABLET ORAL at 09:22

## 2017-07-28 RX ADMIN — THERA TABS 1 TABLET: TAB at 09:23

## 2017-07-28 RX ADMIN — SIMVASTATIN 40 MG: 10 TABLET, FILM COATED ORAL at 20:56

## 2017-07-28 RX ADMIN — RISPERIDONE 3 MG: 1 TABLET ORAL at 20:57

## 2017-07-28 RX ADMIN — RISPERIDONE 3 MG: 1 TABLET ORAL at 09:23

## 2017-07-28 RX ADMIN — ACETAMINOPHEN 650 MG: 325 TABLET ORAL at 16:25

## 2017-07-28 RX ADMIN — ALPRAZOLAM 0.5 MG: 0.5 TABLET ORAL at 18:26

## 2017-07-28 RX ADMIN — PANTOPRAZOLE SODIUM 40 MG: 40 TABLET, DELAYED RELEASE ORAL at 07:11

## 2017-07-28 NOTE — BH NOTES
Pt. has been polite and cooperative in the milieu socializing with staff and peers. Pt. denies suicidal/homicidal ideations, audio/visual hallucination. Pt contracts for safety on the unit agree to come to staff if feeling harm to self or others. Pt.denies any new medical/pain complaints. Pt. did not have any visitors. Staff encouraged Pt. to  participate in treatment,  medication and group therapy. Pt agreed. Pt. remain free of falls and provided non skid socks. Staff will continue to monitor Pt. for behavior safety and location.

## 2017-07-28 NOTE — BSMART NOTE
SW Contact:  Pt made good effort to complete goal sheet & shared with Isabelle Simpson Dr... Still struggles with med issues especially with her xanax hx & shuts down quickly when other options to manage her moods, cognitions & behavior are presented. .. Supported concept that transition from taking xanax x15+ yrs is challenging but that strategies offered before & since coming to [facility] are all in her best interest, as well as outpt plan which is being formulated. Pt will return to live at her motel room of over x9 yrs. .. Other basic CBT principles were shared with her.

## 2017-07-28 NOTE — PROGRESS NOTES
Problem: Suicide/Homicide (Adult/Pediatric)  Goal: *STG: Remains safe in hospital  AEB remaining safe daily while hospitalized. Outcome: Progressing Towards Goal  Pt remains safe free from injury  Goal: *STG: Attends activities and groups  Patient will attend at least 2 activities every day throughout hospital stay. Outcome: Not Progressing Towards Goal  Pt does not attend group and activites with staff encouragement  Goal: *STG/LTG: Complies with medication therapy  Patient will take prescribed medication every day throughout hospital stay. Outcome: Progressing Towards Goal  Pt compliant with medication therapy    Comments:   Patient is cooperative, pleasant, disorganized, comes up to desk multiple times to talk to staff,sometimes forgets and can not elaborate need to why she came to see staff, excessively soft voice, staff provides support and able to assist with needs. Pt visible in milieu, does not attend group and activities, denies A/V hallucinations. Pt eats all meals, interacts with staff. She denies feelings of harm to self or others, denies A/V hallucinations. Encouraged to use gripper socks. Pt remains safe free from injury. Staff continues to monitor safety and provide support.

## 2017-07-28 NOTE — BH NOTES
Patient refused medication Depakote patient stated she is taking to much medication, patient did take her Risperdal and Zocor will continue to monitor,

## 2017-07-29 PROBLEM — F31.9 BIPOLAR DISORDER (HCC): Status: ACTIVE | Noted: 2017-07-29

## 2017-07-29 PROCEDURE — 65220000003 HC RM SEMIPRIVATE PSYCH

## 2017-07-29 PROCEDURE — 74011250637 HC RX REV CODE- 250/637: Performed by: PSYCHIATRY & NEUROLOGY

## 2017-07-29 RX ORDER — RISPERIDONE 1 MG/1
1 TABLET, FILM COATED ORAL 2 TIMES DAILY
Status: DISCONTINUED | OUTPATIENT
Start: 2017-07-29 | End: 2017-07-30

## 2017-07-29 RX ADMIN — RISPERIDONE 1 MG: 1 TABLET, FILM COATED ORAL at 21:00

## 2017-07-29 RX ADMIN — FUROSEMIDE 20 MG: 20 TABLET ORAL at 08:21

## 2017-07-29 RX ADMIN — PANTOPRAZOLE SODIUM 40 MG: 40 TABLET, DELAYED RELEASE ORAL at 06:35

## 2017-07-29 RX ADMIN — ACETAMINOPHEN 650 MG: 325 TABLET ORAL at 10:34

## 2017-07-29 RX ADMIN — ALPRAZOLAM 0.5 MG: 0.5 TABLET ORAL at 18:59

## 2017-07-29 RX ADMIN — PAROXETINE HYDROCHLORIDE 10 MG: 20 TABLET, FILM COATED ORAL at 08:20

## 2017-07-29 RX ADMIN — SIMVASTATIN 40 MG: 10 TABLET, FILM COATED ORAL at 21:00

## 2017-07-29 RX ADMIN — RISPERIDONE 1 MG: 1 TABLET, FILM COATED ORAL at 08:21

## 2017-07-29 RX ADMIN — THERA TABS 1 TABLET: TAB at 08:20

## 2017-07-29 RX ADMIN — ACETAMINOPHEN 650 MG: 325 TABLET ORAL at 17:06

## 2017-07-29 RX ADMIN — ALPRAZOLAM 0.5 MG: 0.5 TABLET ORAL at 10:36

## 2017-07-29 NOTE — BH NOTES
GROUP THERAPY PROGRESS NOTE    Melissa Choudhary is participating in [employer]. Group time: 30 minutes    Personal goal for participation: discussed unit guidelines and participation in treatment and   to be medication compliant. Goal orientation: community    Group therapy participation: minimal    Therapeutic interventions reviewed and discussed:     Impression of participation: sat quietly during group time and then got up   going to room.

## 2017-07-29 NOTE — BH NOTES
DOYLE Note: The above pt has been pleasant upon approach this shift. She has been disorganized in her thought process majority of the shift and would apologize during the conversation for her thoughts. She has performed her hygiene during this shift and she has been very isolative somewhat majority of the shift. She has been complaint with her medications which she has expressed some concerns about her medications during this shift. -A-Problem #1 cont. -P-Maintain a safe and supportive environment.

## 2017-07-29 NOTE — PROGRESS NOTES
Problem: Suicide/Homicide (Adult/Pediatric)  Goal: *STG: Remains safe in hospital  AEB remaining safe daily while hospitalized. Outcome: Progressing Towards Goal  No self-injurious or aggressive behaviors  Goal: *STG/LTG: Complies with medication therapy  Patient will take prescribed medication every day throughout hospital stay. Outcome: Not Progressing Towards Goal  Refused depakote but took all other medications    Problem: Falls - Risk of  Goal: *Absence of falls  Patient will be free of falls every shift throughout hospital stay. Outcome: Progressing Towards Goal  No falls reported/observed    Comments:   Patient ate all of her breakfast. She refused depakote but took all other medications. She stated that the doctor told her she was supposed to stop taking this. When talking she speaks excessively soft, mumbles, and is garbled. She has been at the nurses station several times. Loose associations observed. She states she does not remember things that happened when she was admitted. She continues to apologize and states that \"I'm not myself. \"  She asked to look on her phone to get a phone number for a friend. She was advised that she could only get the phone number from her phone and reminded she could not use it to text or call anyone. She will remain on seizure precautions. Staff will continue to monitor q15 minutes. Staff and nurses will continue to provide a safe and supportive environment.

## 2017-07-29 NOTE — PROGRESS NOTES
**LATE ENTRY**    [address]  Inpatient Progress Note     Date of Service: 07/28/17  Hospital Day: 3     Subjective/Interval History   07/28/17    Treatment Team Notes:  Notes reviewed and/or discussed. Patient interview: Elias Moreno was interviewed by this writer today. Patient remains disorganized and confused. Speech remains rapid but is not pressures. Pt reports she is not sleeping well. Pt is intrusive and interrupts this provider while he is speaking with nursing and reviewing charts. Pt is apologetic though. Pt's speech is slurred at times. She states, \"I know something is not right\" but has difficulty articulating her issues. Pt vehemently denies SI, HI, AVH and paranoia. She denies having episodes similar to this in the past.       Objective     Vitals:    07/26/17 0830 07/27/17 0437 07/27/17 0745 07/28/17 0815   BP: 152/83 117/83 138/80 115/67   Pulse: 81 83 91 83   Resp: 15 16 16 18   Temp: 96.9 °F (36.1 °C) 96.9 °F (36.1 °C) 97.3 °F (36.3 °C) 96.8 °F (36 °C)   SpO2:       Weight:       Height:           Mental Status Examination     Appearance/Hygiene Fair hygiene. In no acute distress. Behavior/Social Relatedness Appears uncomfortable/agitated. Hyperactive and pacing. Relatedness: fair. Musculoskeletal Gait/Station: appropriate  Tone (flaccid, cogwheeling, spastic): appropriate  Psychomotor (hyperkinetic, hypokinetic): appropriate   Involuntary movements (tics, dyskinesias, akathisia, stereotypies): none    AIMS/Silverman: 0 (zero)   Speech   Slurred at times. Rapid, low volume,    Mood   irritable   Affect    irritable   Thought Process Tangential, flight of ideas and perseveration present.      Thought Content and Perceptual Disturbances Denies delusions, ideas of reference, overvalued ideas, ruminations, obsession, compulsions, and phobias    Denies self-injurious behavior (SIB), suicidal ideation (SI), aggressive behavior or homicidal ideation (HI)    Denies auditory and visual hallucinations   Sensorium and Cognition  AOx4, attention intact, memory intact, language use appropriate, and fund of knowledge age appropriate   Insight  impaired due to psychiatric condition   Judgment impaired due to psychiatric condition        Assessment/Plan      Psychiatric Diagnoses: F30.2 Manic episode with psychotic features     Medical Diagnoses:   Past Medical History:   Diagnosis Date    Anxiety 8/26/2010    GERD (gastroesophageal reflux disease)     Grief reaction 8/26/2010    Headache     Headache disorder 8/26/2010    Hypercholesterolemia     Pure hypercholesterolemia 8/26/2010    Walking pneumonia      Psychosocial and contextual factors: medication changes. Level of impairment/disability: severe Mertha Danger is a 65 y.o. who is currently experiencing manic symptoms possibly in the context of recent medication changes. Pt is not sleeping well but she is complaining about drooling related to risperidone usage. Will decrease risperidone to 1mg po BID and attempt to maximize valproic acid dosing. Will also consider scheduling a low dose Xanax if she has been on medication for an extended period of time. 1.  Manic episode with psychotic features   - Decrease risperidone to 1mg po BID due to EPS. - Will attempt to maximize VPA but will not increase tonight due to Risperdal side effects. - continue paroxetine at its current dosage. 2.  Reviewed instructions, risks, benefits and side effects of medications  3.   Disposition/Discharge Date:     MD DR. OPAL [facility]  Psychiatry    **LATE ENTRY**

## 2017-07-29 NOTE — PROGRESS NOTES
[address]  Inpatient Progress Note     Date of Service: 07/29/17  Hospital Day: 4     Subjective/Interval History   07/29/17    Treatment Team Notes:  Notes reviewed and/or discussed. Pt with hx of benzo and narcotic management. She is experiencing increased anxiety and cognitive cloudiness. Pt is refusing Depakote (last taken 2 days ago). Patient interview: Sánchez Ng was interviewed by this writer today. Pt pleaded for discharge this morning as she feels she is worsening. She feels increasingly tremulous with frequent memory lapses and anxiety. She believes she is over medicated. Pt is compliant with her meds and voices concerns of dry mouth. Pt denies SI/HI/AVH. Objective     Vitals:    07/27/17 0437 07/27/17 0745 07/28/17 0815 07/29/17 0832   BP: 117/83 138/80 115/67 123/71   Pulse: 83 91 83 85   Resp: 16 16 18 16   Temp: 96.9 °F (36.1 °C) 97.3 °F (36.3 °C) 96.8 °F (36 °C) 98 °F (36.7 °C)   SpO2:       Weight:       Height:           Mental Status Examination     Appearance/Hygiene Fair hygiene. In no acute distress. Behavior/Social Relatedness Appears uncomfortable/agitated. Hyperactive and pacing. Relatedness: fair. Musculoskeletal Gait/Station: appropriate  Tone (flaccid, cogwheeling, spastic): appropriate  Psychomotor (hyperkinetic, hypokinetic): appropriate   Involuntary movements (tics, dyskinesias, akathisia, stereotypies): tremulous   Speech   Slurred at times.  Rapid, low volume,    Mood   irritable   Affect    irritable   Thought Process linear     Thought Content and Perceptual Disturbances Denies self-injurious behavior (SIB), suicidal ideation (SI), aggressive behavior or homicidal ideation (HI)    Denies auditory and visual hallucinations   Sensorium and Cognition  AOx4,   Insight  impaired due to psychiatric condition   Judgment impaired due to psychiatric condition        Assessment/Plan      Psychiatric Diagnoses: F30.2 Manic episode with psychotic features     Medical Diagnoses: hypercholesterolemia    Psychosocial and contextual factors: medication changes. Level of impairment/disability: severe    Eleanor Humphrey is a 65 y.o. who is not stabilizing. Agreed with recent recommendations. Will monitor on reduced antipsychotic. Pt denies hx of manic symptoms but due to pt's current presentation, will reduce medications to better clarify symptoms. It is unclear if pt is experiencing a Paxil withdrawal vs Xanax withdrawal vs EPS from Risperdal vs undermanaged manic symptoms. 1.  Continue Risperdal 1mg BID. Will monitor on lower dosage. 2.  Pt refuses Depakote. Checking VPA would be unhelpful. Will consider d/c Depakote tomorrow. 3.  Continue Xanax PRN for anxiety. 4.  Continue Paxil 10mg; this medication can be mood destabilizing.      MD DR. OPAL [facility]  Psychiatry

## 2017-07-30 PROCEDURE — 74011250637 HC RX REV CODE- 250/637: Performed by: PSYCHIATRY & NEUROLOGY

## 2017-07-30 PROCEDURE — 65220000003 HC RM SEMIPRIVATE PSYCH

## 2017-07-30 RX ORDER — RISPERIDONE 1 MG/1
0.5 TABLET, FILM COATED ORAL 2 TIMES DAILY
Status: DISCONTINUED | OUTPATIENT
Start: 2017-07-30 | End: 2017-07-31

## 2017-07-30 RX ORDER — CLONAZEPAM 0.5 MG/1
1 TABLET ORAL
Status: DISCONTINUED | OUTPATIENT
Start: 2017-07-30 | End: 2017-07-31

## 2017-07-30 RX ORDER — PAROXETINE HYDROCHLORIDE 20 MG/1
20 TABLET, FILM COATED ORAL DAILY
Status: DISCONTINUED | OUTPATIENT
Start: 2017-07-31 | End: 2017-08-01 | Stop reason: HOSPADM

## 2017-07-30 RX ADMIN — ALPRAZOLAM 0.5 MG: 0.5 TABLET ORAL at 10:06

## 2017-07-30 RX ADMIN — PANTOPRAZOLE SODIUM 40 MG: 40 TABLET, DELAYED RELEASE ORAL at 06:31

## 2017-07-30 RX ADMIN — FUROSEMIDE 20 MG: 20 TABLET ORAL at 08:10

## 2017-07-30 RX ADMIN — PAROXETINE HYDROCHLORIDE 10 MG: 20 TABLET, FILM COATED ORAL at 08:10

## 2017-07-30 RX ADMIN — RISPERIDONE 0.5 MG: 1 TABLET ORAL at 20:29

## 2017-07-30 RX ADMIN — SIMVASTATIN 40 MG: 10 TABLET, FILM COATED ORAL at 20:29

## 2017-07-30 RX ADMIN — RISPERIDONE 1 MG: 1 TABLET, FILM COATED ORAL at 08:10

## 2017-07-30 RX ADMIN — CLONAZEPAM 1 MG: 0.5 TABLET ORAL at 14:58

## 2017-07-30 RX ADMIN — THERA TABS 1 TABLET: TAB at 08:10

## 2017-07-30 RX ADMIN — ACETAMINOPHEN 650 MG: 325 TABLET ORAL at 12:20

## 2017-07-30 RX ADMIN — CLONAZEPAM 1 MG: 0.5 TABLET ORAL at 20:29

## 2017-07-30 NOTE — PROGRESS NOTES
Problem: Suicide/Homicide (Adult/Pediatric)  Goal: *STG: Remains safe in hospital  AEB remaining safe daily while hospitalized. Outcome: Progressing Towards Goal  Patient remains safe. Goal: *STG: Attends activities and groups  Patient will attend at least 2 activities every day throughout hospital stay. Outcome: Not Progressing Towards Goal  Patient is not attending groups. Problem: Falls - Risk of  Goal: *Absence of falls  Patient will be free of falls every shift throughout hospital stay. Outcome: Progressing Towards Goal  Patient remains free of falls. Comments:   Patient came to the desk first thing this morning and was a lot clearer with her speech that the past couple of days. Patient appeared to have her thoughts together better than yesterday as well. Patient then took her medication this morning and about an hours later when she again came to the desk she was slurring again and seemed more disorganized again. Patient then requested her Xanax for anxiety and now appears more drowsy and is speaking really quietly as well. Patient denies SI/HI and AVH. MD notified. Patient is calmer as well but not as clear as later in the day yesterday and early this morning.

## 2017-07-30 NOTE — PROGRESS NOTES
[address]  Inpatient Progress Note     Date of Service: 07/30/17  Hospital Day: 5     Subjective/Interval History   07/30/17    Treatment Team Notes:  Notes reviewed and/or discussed. Pt displayed improved speech with reduced Risperdal to 1mg BID. Patient interview: Tatyana Estrada was interviewed by this writer today. Pt reported feeling much better this morning. Her speech is clearer. Pt's anxiety is improved as well. She is interested in discharge as soon as possible. Pt denies any SI/HI/AVH. Later in the morning, pt experienced another episode of disorganized speech. It seems that this occurred after receiving Risperdal but before xanax. Pt denies any hx of manic or hypomanic symptoms. Objective     Vitals:    07/28/17 0815 07/29/17 0832 07/30/17 0810 07/30/17 0850   BP: 115/67 123/71 129/69 129/69   Pulse: 83 85 78 78   Resp: 18 16  18   Temp: 96.8 °F (36 °C) 98 °F (36.7 °C)  96.5 °F (35.8 °C)   SpO2:       Weight:       Height:           Mental Status Examination     Appearance/Hygiene Fair hygiene, 64 yo female   Behavior/Social Relatedness appropriate   Musculoskeletal Gait/Station: appropriate  Tone (flaccid, cogwheeling, spastic): appropriate  Psychomotor (hyperkinetic, hypokinetic): appropriate   Involuntary movements (tics, dyskinesias, akathisia, stereotypies): tremulous   Speech   Low volume, talkative   Mood   restricted   Affect    restricted   Thought Process linear     Thought Content and Perceptual Disturbances Denies self-injurious behavior (SIB), suicidal ideation (SI), aggressive behavior or homicidal ideation (HI)    Denies auditory and visual hallucinations   Sensorium and Cognition  AOx4,   Insight  fair   Judgment fair        Assessment/Plan      Psychiatric Diagnoses:   1. Benzodiazepine withdrawal  2. Major depressive disorder, recurrent, moderate  3.   Rule out bipolar disorder, mre manic    Medical Diagnoses: hypercholesterolemia    Psychosocial and contextual factors: medication changes. Level of impairment/disability: severe Rolin Golds is a 65 y.o. who is not stabilizing. After reviewing the chart it seems that the pt is experiencing benzo withdrawal complicated by psychotic symptoms. Her speech is periodically slurred and disorganized which could be a function of benzo withdrawal than abad. Pt appeared over medicated on Risperdal.  Other possbilities include Paxil withdrawal or under treated mood symptoms. 1.  Decrease Risperdal to 0.5mg BID. Will monitor on lower dosage. 2.  Discontinue Depakote as pt refuses and lack of indication. 3.  Start longer acting benzo, Klonopin PRN instead of Xanax. Longer acting will better cover any withdrawal symptoms during the day. 4.   Increase Paxil to 20mg (pt was taking 40mg at home and could be experiencing Paxil withdrawal).      Tentative date of discharge: pending    Grey Celestin MD [facility]  [facility]iatry

## 2017-07-30 NOTE — BH NOTES
Pt.was quiet and pleasant but frequented the nurses station while asking repetitious questions. She presented as confused at times but was easily re-oriented and redirected. Staff encouraged her to communicate openly and to seek support when feeling overwhelmed or frustrated. She was also encouraged to increase participation levels in activities/groups and treatment. She was polite and receptive but preferred to stay to self and rest in room. She was also compliant with medications, meals, hygiene and unit protocol. She denied any suicidal/homicidal ideation. Staff will continue to monitor and support.

## 2017-07-30 NOTE — BH NOTES
Parmjit Bhatti is participating in RN Group Note. Group time: 15 minutes    Personal goal for participation: Staying Healthy    Goal orientation: Treatment Compliance    Group therapy participation: minimal interaction. Therapeutic interventions reviewed and discussed: Eating well, personal hygiene, medication compliance, and follow-up care. Impression of participation: Fully participated in group.

## 2017-07-30 NOTE — BH NOTES
Pt.presented with flat affect and depressed mood. Her communication patterns were repetitive and thoughts seemed disorganized. Her speech was slurred and difficult to understand due to mumbling. Staff encouraged her to continue communicating her feelings openly while assisting in orienting her to reality and prompting her to attend and participate in group. She was receptive but appeared confused at times. She attended group but did not interact or communicate. She was compliant with medications, meals, and hygiene. She denied SI but expressed feeling frustrated. Staff prompted her to seek support if feeling overwhelmed. Staff will continue to monitor and support.

## 2017-07-30 NOTE — BH NOTES
GROUP THERAPY PROGRESS NOTE    Kyung Schilling is participating in Coping Skills Group.      Group time:1 hr.    Personal goal for participation: Open honest communication    Goal orientation: personal    Group therapy participation: minimal    Therapeutic interventions reviewed and discussed: Identifying triggers and exploring coping skills    Impression of participation: Pt.participated minimally while listening but did not engage group activity

## 2017-07-30 NOTE — BH NOTES
GROUP THERAPY PROGRESS NOTE    Bard Lawton is not participating in Recreational Therapy.      Group time: 45 min    Personal goal for participation: n/a    Goal orientation: social    Group therapy participation: not present    Therapeutic interventions reviewed and discussed: Positive social interaction/ recreation    Impression of participation: Pt.declined attending group and preferred to rest on unit

## 2017-07-30 NOTE — BH NOTES
Patient came to the nurses station asking about her paxil. She was taking 40 mg at home and is currently taking 10 mg.

## 2017-07-31 PROBLEM — F13.939 BENZODIAZEPINE WITHDRAWAL (HCC): Status: ACTIVE | Noted: 2017-07-31

## 2017-07-31 PROBLEM — F31.9 BIPOLAR DISORDER (HCC): Status: RESOLVED | Noted: 2017-07-29 | Resolved: 2017-07-31

## 2017-07-31 PROCEDURE — 74011250637 HC RX REV CODE- 250/637: Performed by: PSYCHIATRY & NEUROLOGY

## 2017-07-31 PROCEDURE — 65220000003 HC RM SEMIPRIVATE PSYCH

## 2017-07-31 RX ORDER — CLONAZEPAM 0.5 MG/1
0.5 TABLET ORAL 2 TIMES DAILY
Status: DISCONTINUED | OUTPATIENT
Start: 2017-07-31 | End: 2017-07-31

## 2017-07-31 RX ORDER — RISPERIDONE 0.25 MG/1
0.25 TABLET, FILM COATED ORAL 2 TIMES DAILY
Status: DISCONTINUED | OUTPATIENT
Start: 2017-07-31 | End: 2017-08-01

## 2017-07-31 RX ORDER — CLONAZEPAM 0.5 MG/1
0.5 TABLET ORAL 2 TIMES DAILY
Status: DISCONTINUED | OUTPATIENT
Start: 2017-07-31 | End: 2017-08-01 | Stop reason: HOSPADM

## 2017-07-31 RX ADMIN — RISPERIDONE 0.25 MG: 0.25 TABLET ORAL at 20:23

## 2017-07-31 RX ADMIN — SIMVASTATIN 40 MG: 10 TABLET, FILM COATED ORAL at 20:23

## 2017-07-31 RX ADMIN — ACETAMINOPHEN 650 MG: 325 TABLET ORAL at 12:01

## 2017-07-31 RX ADMIN — PAROXETINE HYDROCHLORIDE 20 MG: 20 TABLET, FILM COATED ORAL at 08:34

## 2017-07-31 RX ADMIN — FUROSEMIDE 20 MG: 20 TABLET ORAL at 08:34

## 2017-07-31 RX ADMIN — THERA TABS 1 TABLET: TAB at 08:35

## 2017-07-31 RX ADMIN — CLONAZEPAM 1 MG: 0.5 TABLET ORAL at 08:37

## 2017-07-31 RX ADMIN — PANTOPRAZOLE SODIUM 40 MG: 40 TABLET, DELAYED RELEASE ORAL at 06:31

## 2017-07-31 RX ADMIN — CLONAZEPAM 0.5 MG: 0.5 TABLET ORAL at 20:23

## 2017-07-31 RX ADMIN — RISPERIDONE 0.5 MG: 1 TABLET ORAL at 08:34

## 2017-07-31 NOTE — PROGRESS NOTES
Problem: Suicide/Homicide (Adult/Pediatric)  Goal: *STG: Remains safe in hospital  AEB remaining safe daily while hospitalized. Outcome: Progressing Towards Goal  Pt remains safe in hospital  Goal: *STG: Seeks staff when feelings of self harm or harm towards others arise  Patient will talk with staff every shift while awake re: feelings of self harm or harm to others throughout hospital stay. Outcome: Progressing Towards Goal  Pt denies feelings of harm to self or others  Goal: *STG: Attends activities and groups  Patient will attend at least 2 activities every day throughout hospital stay. Outcome: Not Progressing Towards Goal  Pt does not attend group and activities with staff encouragement    Comments:   Patient is cooperative with medication therapy, eats all meals. She is visible in milieu, interacts with staff. She does not attend group and activities with staff encouragement. Patient denies feelings of harm to self or others, denies A/V hallucinations. She is anxious and comes to desk to ask staff about concerns multiple times after staff had previously discussed with pt. Provided pt education and positive coping strategies, needs reinforcement. Doctor and SW discussed with pt on treatment and discharge plans. Coordinated with patient's friend and doctor about meeting previously requested by doctor for phone conversation. Doctor talked to friend. Discussed and answered questions from patient and friend regarding pt care. . Staff continues to monitor safety and provide supportive environment.

## 2017-07-31 NOTE — PROGRESS NOTES
[address]  Inpatient Progress Note      Date of Service: 7/31/2017   Hospital Day: 5      Subjective/Interval History     7/31/2017   Treatment Team Notes:  Notes reviewed and/or discussed. Pt displayed improved speech with reduced Risperdal to 0.5mg BID.      Patient interview: Selina Morrow was interviewed by this writer today. Pt reported feeling much better this morning. Her speech is clearer. Continues to be anxious. Pt discussed clonazepam usage in lieu of Xanax. Continues to be intrusive. Denies medication side effects. Sleep and appetite are stable. Denies SI, HI and AVH. Paranoia is absent.       Pt denies any hx of manic or hypomanic symptoms.      Objective     Visit Vitals    /70    Pulse 82    Temp 97.3 °F (36.3 °C)    Resp 17    Ht 5' 5\" (1.651 m)    Wt 49.9 kg (110 lb)    SpO2 99%    Breastfeeding No    BMI 18.3 kg/m2         Mental Status Examination      Appearance/Hygiene Fair hygiene    Behavior/Social Relatedness appropriate   Musculoskeletal Gait/Station: appropriate  Tone (flaccid, cogwheeling, spastic): appropriate  Psychomotor (hyperkinetic, hypokinetic): appropriate   Involuntary movements (tics, dyskinesias, akathisia, stereotypies): tremulous   Speech                          Low volume, talkative   Mood                          Restricted   Affect                                                   Anxious    Thought Process linear   Thought Content and Perceptual Disturbances Denies self-injurious behavior (SIB), suicidal ideation (SI), aggressive behavior or homicidal ideation (HI)     Denies auditory and visual hallucinations   Sensorium and Cognition              AOx4,   Insight              fair   Judgment fair          Assessment/Plan       Psychiatric Diagnoses:   1. Benzodiazepine withdrawal  2. Major depressive disorder, recurrent, moderate  3.   Rule out bipolar disorder, mre manic     Medical Diagnoses: hypercholesterolemia     Psychosocial and contextual factors: medication changes.      Level of impairment/disability: severe Jodean Handing is a 65 y.o. who is not stabilizing. Pt is likely in benzodiazepine withdrawal. Pt continues to be anxious and intrusive. Will schedule clonazepam at 0.5mg po BID for benzodiazepine withdrawal. Will continue paroxetine 20mg po every day. Pt is eating and sleeping. Will decrease risperidone to 0.25mg po BID. Denies SI, HI and AVH. Other possibilities include Paxil withdrawal or under treated mood symptoms.       1. Decrease Risperdal to 0.25mg BID. Will monitor on lower dosage.   2.  Will schedule clonazepam 0.5mg po BID for benzodiazepine withdrawal.   3.  Continue Paxil to 20mg (pt was taking 40mg at home and could be experiencing Paxil withdrawal).      Tentative date of discharge: 8-3-17     Rian Oneal MD DR. [facility]  [facility]iatry

## 2017-07-31 NOTE — BSMART NOTE
SW Contact:  Pt some anxiety about d/c but this mostly worry about not being d/c to take care of rent & other financial obligations. Reviewed basic CBT principles. . As well as d/c plan (see fyi). Mouna Portillo commitment to comply. Possibly has friend \"Vidya\" coming to speak with her Dr if he'll be available.

## 2017-07-31 NOTE — BH NOTES
GROUP THERAPY PROGRESS NOTE    Philomena Fountain is not participating in [place]. Staff encouraged and pt refused.

## 2017-07-31 NOTE — BSMART NOTE
ACTIVITIES THERAPY PROGRESS NOTE    Group time:1530  The patient did not refuse, but, did not come to group. Patient in day area when group announced.

## 2017-07-31 NOTE — BH NOTES
Romana Goodpasture is participating in Music Therapy. Group time: 1700    Personal goal for participation:  Relax while listening to Aromatherapy music    Goal orientation: relaxation    Group therapy participation: active    Therapeutic interventions reviewed and discussed: Staff encouraged Pt.  To participate in listening to soft music    Impression of participation:  Pt. was calm relax and coloring zing patterns while listening to [address]

## 2017-07-31 NOTE — BH NOTES
Patient signed CHERRY for friend Rosalba Farris to talk to staff and doctor. Doctor talked to friend and states will put in order allowing friend to visit at this time to discuss hotel situation of patient and will write order for special visit. Writer assisted friend to unit, pt with friend at day area. Pt requested and authorized to retrieve from security valuables with credit card, pt aware she will take responsibility for valuables and can not send back to security per policy.

## 2017-08-01 VITALS
HEIGHT: 65 IN | BODY MASS INDEX: 18.33 KG/M2 | TEMPERATURE: 97 F | DIASTOLIC BLOOD PRESSURE: 67 MMHG | WEIGHT: 110 LBS | RESPIRATION RATE: 16 BRPM | HEART RATE: 75 BPM | SYSTOLIC BLOOD PRESSURE: 128 MMHG | OXYGEN SATURATION: 99 %

## 2017-08-01 PROBLEM — F13.930 BENZODIAZEPINE WITHDRAWAL WITHOUT COMPLICATION (HCC): Status: ACTIVE | Noted: 2017-07-31

## 2017-08-01 PROBLEM — F06.4 ANXIETY DISORDER DUE TO MEDICAL CONDITION: Status: ACTIVE | Noted: 2017-07-25

## 2017-08-01 PROCEDURE — 74011250637 HC RX REV CODE- 250/637: Performed by: PSYCHIATRY & NEUROLOGY

## 2017-08-01 RX ORDER — FUROSEMIDE 20 MG/1
TABLET ORAL
Qty: 30 TAB | Refills: 0 | Status: SHIPPED | OUTPATIENT
Start: 2017-08-01 | End: 2021-08-18

## 2017-08-01 RX ORDER — PAROXETINE HYDROCHLORIDE 40 MG/1
20 TABLET, FILM COATED ORAL DAILY
Qty: 30 TAB | Refills: 0 | Status: SHIPPED | OUTPATIENT
Start: 2017-08-01 | End: 2018-12-03

## 2017-08-01 RX ORDER — CLONAZEPAM 0.5 MG/1
0.5 TABLET ORAL 2 TIMES DAILY
Qty: 60 TAB | Refills: 0 | Status: SHIPPED | OUTPATIENT
Start: 2017-08-01 | End: 2018-12-03

## 2017-08-01 RX ADMIN — PAROXETINE HYDROCHLORIDE 20 MG: 20 TABLET, FILM COATED ORAL at 08:33

## 2017-08-01 RX ADMIN — FUROSEMIDE 20 MG: 20 TABLET ORAL at 08:33

## 2017-08-01 RX ADMIN — ACETAMINOPHEN 650 MG: 325 TABLET ORAL at 12:48

## 2017-08-01 RX ADMIN — PANTOPRAZOLE SODIUM 40 MG: 40 TABLET, DELAYED RELEASE ORAL at 06:31

## 2017-08-01 RX ADMIN — CLONAZEPAM 0.5 MG: 0.5 TABLET ORAL at 08:33

## 2017-08-01 RX ADMIN — THERA TABS 1 TABLET: TAB at 08:33

## 2017-08-01 NOTE — BH NOTES
GROUP THERAPY PROGRESS NOTE    Sánchez Ng is participating in [place].      Group time: 30 minutes    Personal goal for participation: discussed unit guidelines and participation in treatment      Goal orientation: personal    Group therapy participation: minimal    Therapeutic interventions reviewed and discussed:     Impression of participation: pt sat in group asking questions about being dicharged today

## 2017-08-01 NOTE — BSMART NOTE
SW Contact:  Pt focus & concentration much improved, a lot less anxious. Pt agreed to comply with d/c plan. See FYI. Has started to gain some insight into CBT principles as well as impact of relying on xanax primarily for several years to help manage her anxiety.

## 2017-08-01 NOTE — PROGRESS NOTES
Problem: Suicide/Homicide (Adult/Pediatric)  Goal: *STG: Remains safe in hospital  AEB remaining safe daily while hospitalized. Outcome: Progressing Towards Goal  Denies suicidal thoughts. Goal: *STG: Attends activities and groups  Patient will attend at least 2 activities every day throughout hospital stay. Outcome: Progressing Towards Goal  Attending groups. Goal: *STG/LTG: Complies with medication therapy  Patient will take prescribed medication every day throughout hospital stay. Outcome: Progressing Towards Goal  Taking medications. Comments:   Patient states she may be discharged today Talked about positive coping skills and dealing with anxiety.

## 2017-08-01 NOTE — DISCHARGE INSTRUCTIONS
BEHAVIORAL HEALTH NURSING DISCHARGE NOTE      The following personal items collected during your admission are returned to you:   Dental Appliance: Dental Appliances: None  Vision: Visual Aid: With patient, Glasses  Hearing Aid:    Jewelry: Jewelry: Ring  Clothing: Clothing: Dress, Undergarments, Footwear  Other Valuables: Other Valuables: Cell Phone, Eyeglasses, Rahu 37 sent to safe: Personal Items Sent to Safe:  ($10.00, (2) visa cards,(2) ss cards,medicare card)      PATIENT INSTRUCTION      The discharge information has been reviewed with the patient. The patient verbalized understanding. Patient armband removed and shredded    BEHAVIORAL HEALTH NURSING DISCHARGE NOTE      The following personal items collected during your admission are returned to you:   Dental Appliance: Dental Appliances: None  Vision: Visual Aid: With patient, Glasses  Hearing Aid:    Jewelry: Jewelry: Ring  Clothing: Clothing: Dress, Undergarments, Footwear  Other Valuables: Other Valuables: Cell Phone, Eyeglasses, Rahu 37 sent to safe: Personal Items Sent to Safe:  ($10.00, (2) visa cards,(2) ss cards,medicare card)      PATIENT INSTRUCTIONS:      Follow-up with ***       The discharge information has been reviewed with the {PATIENT PARENT GUARDIAN:[id number]}. The {PATIENT PARENT GUARDIAN:[id number]} verbalized understanding.         Patient {ARMBANDS:[id number]}

## 2017-08-01 NOTE — BH NOTES
Pt. has been polite and cooperative in the milieu socializing with staff and peers. Pt. denies suicidal/homicidal ideations, audio/visual hallucination. Pt contracts for safety on the unit agree to come to staff if feeling harm to self or others. Pt.denies any new medical/pain complaints. Pt. completed ADL. Pt. ate 100% of meals and took scheduled medications. Pt. participate in group. Pt. did not have any visitors. Staff encouraged Pt. to  participate in treatment,  medication and group therapy. Pt agreed. Pt. remain free of falls and provided non skid socks. Staff will continue to monitor Pt. for behavior safety and location.

## 2017-08-01 NOTE — DISCHARGE SUMMARY
[facility]  Inpatient Psychiatry   Discharge Summary     Admit date: 7/24/2017    Discharge date and time: 8/1/2017  2:42 PM    Discharge Physician: Luis Madsen MD    DISCHARGE DIAGNOSES     Psychiatric Diagnoses:   1.  Benzodiazepine withdrawal  2.  Major depressive disorder, recurrent, moderate      Medical Diagnoses: hypercholesterolemia      Psychosocial and contextual factors: medication changes.       Level of impairment/disability: mild    [address] Tyree King presented to the inpatient unit where the pt was initially thought to be manic. She was started on risperidone and Depakote. Her paroxetine dosage was decreased from 40mg po every day to 10mg po every day. The pt experienced EPS from risperidone. It was also noted, as an outpatient, Ms. Selby benzodiazepine was discontinued. The pt did not have a history of abad. It was determined she was likely experiencing benzodiazepine withdrawal along with withdrawal from paroxetine. She was started on a clonazepam 1mg po BID/prn for withdrawals which was later scheduled to clonazepam 0.5mg po BID. Paroxetine was increased to 20mg po every day. Risperidone and Depakote were gradually tapered to discontinuation with good results. The pt's EPS and anxiety resolved quickly on the new medication regimen. The pt became less intrusive, agitated and irritable and by 8/1/2017, her mood stabilized, thought processes were linear, logical and goal directed. Her speech was clear, concise and of normal rate, tone and volume. The pt attended groups, displayed appropriate hygiene, slept well, ate well and did not display any negative behaviors towards staff. I explained to the pt that she should not take opiate analgesics along with benzodiazepines as this is a dangerous combination. Pt agreed and expressed verbal understanding.  Explained to the pt that she will need to speak with her neurologist about other pain management interventions for pain. Also explained to pt the nature of benzodiazepine withdrawal vs. Opiate withdrawal and their consequences including death with benzodiazepine withdrawal. Pt again expressed verbal understanding. Pt denies SI, HI and AVH at this time. DISPOSITION/RECOMMENDATIONS     Disposition:  Patient was discharged home  with follow-up made below:     MEDICATION CHANGES   Outpatient medications:  No current facility-administered medications on file prior to encounter. Current Outpatient Prescriptions on File Prior to Encounter   Medication Sig Dispense Refill    HYDROcodone-acetaminophen (NORCO) 5-325 mg per tablet Take 1 Tab by mouth every four (4) hours as needed for Pain. Max Daily Amount: 6 Tabs. 12 Tab 0    albuterol sulfate (PROVENTIL;VENTOLIN) 2.5 mg/0.5 mL nebu nebulizer solution by Nebulization route once.  multivitamin (ONE A DAY) tablet Take 1 Tab by mouth daily.  omeprazole (PRILOSEC) 40 mg capsule Take 40 mg by mouth daily.  simvastatin (ZOCOR) 40 mg tablet Take 1 Tab by mouth nightly. 30 Tab 3    hydrOXYzine pamoate (VISTARIL) 25 mg capsule Take 1 Cap by mouth every six (6) hours as needed for Anxiety.  10 Cap 0         Medications discontinued during hospitalization:  Medications Discontinued During This Encounter   Medication Reason    hydrOXYzine pamoate (VISTARIL) capsule 50 mg Duplicate Order    PARoxetine (PAXIL) tablet 40 mg     PARoxetine (PAXIL) tablet 40 mg     risperiDONE (RisperDAL) tablet 2 mg     divalproex DR (DEPAKOTE) tablet 250 mg     ibuprofen (MOTRIN) tablet 600 mg     risperiDONE (RisperDAL) tablet 3 mg     divalproex DR (DEPAKOTE) tablet 500 mg     ALPRAZolam (XANAX) tablet 0.5 mg     risperiDONE (RisperDAL) tablet 1 mg     PARoxetine (PAXIL) tablet 10 mg     clonazePAM (KlonoPIN) tablet 1 mg     clonazePAM (KlonoPIN) tablet 0.5 mg     risperiDONE (RisperDAL) tablet 0.5 mg     risperiDONE (RisperDAL) tablet 0.25 mg     PARoxetine (PAXIL) 40 mg tablet          Discharged medication:  Current Discharge Medication List      START taking these medications    Details   clonazePAM (KLONOPIN) 0.5 mg tablet Take 1 Tab by mouth two (2) times a day. Max Daily Amount: 1 mg. Indications: PANIC DISORDER  Qty: 60 Tab, Refills: 0      furosemide (LASIX) 20 mg tablet Take one tablet by mouth every day. Indications: hypertension  Qty: 30 Tab, Refills: 0         CONTINUE these medications which have CHANGED    Details   PARoxetine (PAXIL) 40 mg tablet Take 0.5 Tabs by mouth daily. Indications: ANXIETY WITH DEPRESSION  Qty: 30 Tab, Refills: 0         CONTINUE these medications which have NOT CHANGED    Details   albuterol sulfate (PROVENTIL;VENTOLIN) 2.5 mg/0.5 mL nebu nebulizer solution by Nebulization route once. multivitamin (ONE A DAY) tablet Take 1 Tab by mouth daily. omeprazole (PRILOSEC) 40 mg capsule Take 40 mg by mouth daily. simvastatin (ZOCOR) 40 mg tablet Take 1 Tab by mouth nightly. Qty: 30 Tab, Refills: 3      hydrOXYzine pamoate (VISTARIL) 25 mg capsule Take 1 Cap by mouth every six (6) hours as needed for Anxiety. Qty: 10 Cap, Refills: 0         STOP taking these medications       HYDROcodone-acetaminophen (NORCO) 5-325 mg per tablet Comments:   Reason for Stopping:               Instructions, risks, benefits and side effects were discussed in detail prior to discharge.        LABS/IMAGING DURING ADMISSION     Results for orders placed or performed during the hospital encounter of 14/08/86   SALICYLATE   Result Value Ref Range    SALICYLATE 3.7 2.8 - 20 MG/DL   ACETAMINOPHEN   Result Value Ref Range    Acetaminophen level <2 (L) 10 - 30 ug/mL   ETHYL ALCOHOL   Result Value Ref Range    ALCOHOL(ETHYL),SERUM <3 0 - 3 MG/DL   TSH 3RD GENERATION   Result Value Ref Range    TSH 0.40 0.36 - 3.74 uIU/mL   CBC WITH AUTOMATED DIFF   Result Value Ref Range    WBC 7.3 4.6 - 13.2 K/uL    RBC 4.16 (L) 4.20 - 5.30 M/uL    HGB 13.4 12.0 - 16.0 g/dL HCT 40.7 35.0 - 45.0 %    MCV 97.8 (H) 74.0 - 97.0 FL    MCH 32.2 24.0 - 34.0 PG    MCHC 32.9 31.0 - 37.0 g/dL    RDW 12.9 11.6 - 14.5 %    PLATELET 934 626 - 750 K/uL    MPV 9.0 (L) 9.2 - 11.8 FL    NEUTROPHILS 52 40 - 73 %    LYMPHOCYTES 38 21 - 52 %    MONOCYTES 7 3 - 10 %    EOSINOPHILS 2 0 - 5 %    BASOPHILS 1 0 - 2 %    ABS. NEUTROPHILS 3.8 1.8 - 8.0 K/UL    ABS. LYMPHOCYTES 2.7 0.9 - 3.6 K/UL    ABS. MONOCYTES 0.5 0.05 - 1.2 K/UL    ABS. EOSINOPHILS 0.2 0.0 - 0.4 K/UL    ABS. BASOPHILS 0.1 (H) 0.0 - 0.06 K/UL    DF AUTOMATED     ETHYL ALCOHOL   Result Value Ref Range    ALCOHOL(ETHYL),SERUM <3 0 - 3 MG/DL   METABOLIC PANEL, BASIC   Result Value Ref Range    Sodium 140 136 - 145 mmol/L    Potassium 3.4 (L) 3.5 - 5.5 mmol/L    Chloride 104 100 - 108 mmol/L    CO2 29 21 - 32 mmol/L    Anion gap 7 3.0 - 18 mmol/L    Glucose 99 74 - 99 mg/dL    BUN 18 7.0 - 18 MG/DL    Creatinine 0.61 0.6 - 1.3 MG/DL    BUN/Creatinine ratio 30 (H) 12 - 20      GFR est AA >60 >60 ml/min/1.73m2    GFR est non-AA >60 >60 ml/min/1.73m2    Calcium 9.0 8.5 - 10.1 MG/DL        DISCHARGE MENTAL STATUS EVALUATION     Appearance/Hygiene Good hygiene    Behavior/Social Relatedness appropriate   Musculoskeletal Gait/Station: appropriate  Tone (flaccid, cogwheeling, spastic): appropriate  Psychomotor (hyperkinetic, hypokinetic): appropriate   Involuntary movements (tics, dyskinesias, akathisia, stereotypies): appropriate   Speech                          Clear, concise and of normal rate, tone and volume. Mood                          euthymic   Affect                                                   euthymic   Thought Process Linear, logical and goal directed. Thought Content and Perceptual Disturbances Denies self-injurious behavior (SIB), suicidal ideation (SI), aggressive behavior or homicidal ideation (HI)      Denies auditory and visual hallucinations   Sensorium and Cognition              AOx4, cognition wnL. Insight              fair   Judgment fair           SUICIDE RISK ASSESSMENT   Suicide Risk Assessment    Discharge  Date: 08/01/17    [] Admission  [x] Discharge     Key Factors:   Current admission precipitated by suicide attempt?   []  Yes     2    [x]  No     1     Suicide Attempt History  [] Past attempts of high lethality    2 []  Past attempts of low lethality    1 [x]  No previous attempts       0   Suicidal Ideation []  Constant suicidal thoughts      2 []  Intermittent or fleeting suicidal  thoughts  1 [x]  Denies current suicidal thoughts    0   Suicide Plan   []  Has plan with actual OR potential access to planned method    2 []  Has plan without access to planned method      1 [x]  No plan            0   Plan Lethality []  Highly lethal plan (Carbon monoxide, gun, hanging, jumping)    2 []  Moderate lethality of plan          1 [x]  Low lethality of plan (biting, head banging, superficial scratching, pillow over face)  0   Safety Plan Agreement  []  Unwilling OR unable to agree due to impaired reality testing   2   []  Patient is ambivalent and/or guarded      1 [x]  Reliably agrees        0   Current Morbid Thoughts (reunion fantasies, preoccupations with death) []  Constantly     2     []  Frequently    1 [x]  Rarely    0   Elopement Risk  []  High risk     2 []  Moderate risk    1 [x]   Low risk    0   Symptoms    []  Hopeless  []  Helpless  []  Anhedonia   []  Guilt/shame  []  Anger/rage  []  Anxiety  []  Insomnia   []  Agitation   []  Impulsivity  []  5-6 symptoms present    2 []  3-4 symptoms present    1  [x]  0-2 symptoms present    0     Scoring Key:  10 or higher = Imminent Risk (consider 1:1)  4 - 9 = Moderate Risk (consider q 15 minute observation)Attended alcohol, tobacco, prescription and other drug psychoeducation group.   0 - 3 = Low Risk (consider q 30 minute observation)    Total Score: 1  ------------------------------------------------------------------------------------------------------------------  PLEASE ADDRESS THE FOLLOWING 5 ISSUES     Physician's Subjective Appraisal of Risk (check one):  []  Patient replies not trustworthy: several non-verbal cues. []  Patient replies questionable: trustworthy: at least 1 non-verbal cue. [x]  Patient replies appear trustworthy. Family History of Suicide? []  Yes  [x]  No    Protective measures (select all that apply):  [x]  Successful past responses to stress  []  Spiritual/Muslim beliefs  [x]  Capacity for reality testing  [x]  Positive therapeutic relationships  [x]  Social supports/connections  [x]  Positive coping skills  [x]  Frustration tolerance/optimism  []  Children or pets in the home  []  Sense of responsibility to family  [x]  Agrees to treatment plan and follow up    Others (list):    High Risk Diagnoses (select all that apply):  []  Depression/Bipolar Disorder  []  Dual Diagnosis  []  Cardiovascular Disease  []  Schizophrenia  [x]  Chronic Pain  []  Epilepsy  []  Cancer  []  Personality Disorder  []  HIV/AIDS  []  Multiple Sclerosis    Dangerousness Assessment (Suicide, homicide, property destruction. ..)    Risk Factors reviewed and risk assessed to be:  [x] low  [] low-moderate  [] moderate   [] moderate-high  [] high     Protection factors reviewed and risk assessed to be:  [x] low  [] low-moderate  [] moderate   [] moderate-high  [] high     Response to treatment and risk assessed to be:  [x] low  [] low-moderate  [] moderate   [] moderate-high  [] high     Support reviewed and risk assessed to be:  [x] low  [] low-moderate  [] moderate   [] moderate-high  [] high     Acceptance of Discharge and outpatient treatment reviewed and risk assessed to be:    [x] low  [] low-moderate  [] moderate   [] moderate-high  [] high   Overall risk assessed to be:  [x] low  [] low-moderate  [] moderate   [] moderate-high  [] high     Completion of discharge was greater than 30 minutes. Over 50% of today's discharge was geared towards counseling and coordination of care.           Velia Fuentes MD  Psychiatry  [facility]

## 2017-08-06 ENCOUNTER — HOSPITAL ENCOUNTER (EMERGENCY)
Age: 62
Discharge: HOME | End: 2017-08-06
Attending: EMERGENCY MEDICINE | Admitting: EMERGENCY MEDICINE
Payer: MEDICARE

## 2017-08-06 VITALS
WEIGHT: 115 LBS | HEIGHT: 65 IN | HEART RATE: 76 BPM | DIASTOLIC BLOOD PRESSURE: 60 MMHG | BODY MASS INDEX: 19.16 KG/M2 | TEMPERATURE: 99 F | OXYGEN SATURATION: 99 % | SYSTOLIC BLOOD PRESSURE: 128 MMHG | RESPIRATION RATE: 18 BRPM

## 2017-08-06 DIAGNOSIS — L02.01 CUTANEOUS ABSCESS OF FACE: Primary | ICD-10-CM

## 2017-08-06 LAB
BASOPHILS # BLD AUTO: 0.1 K/UL (ref 0–0.06)
BASOPHILS # BLD: 1 % (ref 0–2)
DIFFERENTIAL METHOD BLD: ABNORMAL
EOSINOPHIL # BLD: 0.1 K/UL (ref 0–0.4)
EOSINOPHIL NFR BLD: 1 % (ref 0–5)
ERYTHROCYTE [DISTWIDTH] IN BLOOD BY AUTOMATED COUNT: 12.6 % (ref 11.6–14.5)
HCT VFR BLD AUTO: 38.7 % (ref 35–45)
HGB BLD-MCNC: 12.7 G/DL (ref 12–16)
LYMPHOCYTES # BLD AUTO: 30 % (ref 21–52)
LYMPHOCYTES # BLD: 3.8 K/UL (ref 0.9–3.6)
MCH RBC QN AUTO: 32.6 PG (ref 24–34)
MCHC RBC AUTO-ENTMCNC: 32.8 G/DL (ref 31–37)
MCV RBC AUTO: 99.2 FL (ref 74–97)
MONOCYTES # BLD: 1 K/UL (ref 0.05–1.2)
MONOCYTES NFR BLD AUTO: 8 % (ref 3–10)
NEUTS SEG # BLD: 7.5 K/UL (ref 1.8–8)
NEUTS SEG NFR BLD AUTO: 60 % (ref 40–73)
PLATELET # BLD AUTO: 271 K/UL (ref 135–420)
PMV BLD AUTO: 8.8 FL (ref 9.2–11.8)
RBC # BLD AUTO: 3.9 M/UL (ref 4.2–5.3)
WBC # BLD AUTO: 12.5 K/UL (ref 4.6–13.2)

## 2017-08-06 PROCEDURE — 74011000250 HC RX REV CODE- 250: Performed by: PHYSICIAN ASSISTANT

## 2017-08-06 PROCEDURE — 85025 COMPLETE CBC W/AUTO DIFF WBC: CPT

## 2017-08-06 PROCEDURE — 74011000258 HC RX REV CODE- 258: Performed by: PHYSICIAN ASSISTANT

## 2017-08-06 PROCEDURE — 99282 EMERGENCY DEPT VISIT SF MDM: CPT

## 2017-08-06 PROCEDURE — 96365 THER/PROPH/DIAG IV INF INIT: CPT

## 2017-08-06 RX ORDER — CLINDAMYCIN HYDROCHLORIDE 300 MG/1
300 CAPSULE ORAL 4 TIMES DAILY
Qty: 40 CAP | Refills: 0 | Status: SHIPPED | OUTPATIENT
Start: 2017-08-06 | End: 2017-08-16

## 2017-08-06 RX ORDER — TRAMADOL HYDROCHLORIDE 50 MG/1
50 TABLET ORAL
Qty: 15 TAB | Refills: 0 | Status: SHIPPED | OUTPATIENT
Start: 2017-08-06 | End: 2018-12-03

## 2017-08-06 RX ADMIN — CLINDAMYCIN PHOSPHATE 900 MG: 150 INJECTION, SOLUTION, CONCENTRATE INTRAVENOUS at 12:48

## 2017-08-06 NOTE — ED PROVIDER NOTES
Patient is a 65 y.o. female presenting with abscess. Abscess       Romana Goodpasture is a 65 y.o. female with 36 pkk/yr smoking hx presents with abscess on the R lower jaw x 4-5 days with \"discharge\" noted by patient \"when I squeeze\". Had noticed some facial pain but denies of any fever, chills, n/v or diff breathing. Denies being on oral abx or being seen for similar issues in the past..  Denies of any neck pain, SOB or chest pain. Past Medical History:   Diagnosis Date    Anxiety 8/26/2010    GERD (gastroesophageal reflux disease)     Grief reaction 8/26/2010    Headache     Headache disorder 8/26/2010    Hypercholesterolemia     Pure hypercholesterolemia 8/26/2010    Walking pneumonia        Past Surgical History:   Procedure Laterality Date    HX COLONOSCOPY           Family History:   Problem Relation Age of Onset    Hypertension Father     Elevated Lipids Father     Heart Disease Father        Social History     Social History    Marital status:      Spouse name: N/A    Number of children: N/A    Years of education: N/A     Occupational History    Not on file. Social History Main Topics    Smoking status: Current Some Day Smoker     Packs/day: 0.25     Years: 30.00    Smokeless tobacco: Never Used    Alcohol use No    Drug use: No    Sexual activity: Not Currently     Partners: Male     Other Topics Concern    Not on file     Social History Narrative         ALLERGIES: Aspirin; Darvocet a500 [propoxyphene n-acetaminophen]; Midrin [sphqtrj-iowphacuy-ajyxrktewxcn]; Neurontin [gabapentin]; Ultram [tramadol]; and Codeine    Review of Systems   Constitutional: Negative for chills and fever. HENT: Negative for ear discharge, ear pain, hearing loss, nosebleeds, postnasal drip, rhinorrhea, sinus pressure, sneezing, sore throat, tinnitus and trouble swallowing. Eyes: Negative for photophobia, pain, discharge, redness and itching.    Respiratory: Negative for cough, chest tightness, shortness of breath, wheezing and stridor. Cardiovascular: Negative for chest pain, palpitations and leg swelling. Gastrointestinal: Negative for abdominal pain, anal bleeding, blood in stool, constipation, diarrhea, nausea and vomiting. Genitourinary: Negative for decreased urine volume, dysuria, flank pain, genital sores, hematuria, menstrual problem, pelvic pain, urgency, vaginal bleeding, vaginal discharge and vaginal pain. Musculoskeletal: Negative for back pain, gait problem, joint swelling, neck pain and neck stiffness. Neurological: Negative for dizziness, facial asymmetry, speech difficulty, light-headedness and headaches. Psychiatric/Behavioral: Negative for confusion and hallucinations. Vitals:    08/06/17 1131   BP: 128/60   Pulse: 76   Resp: 18   Temp: 99 °F (37.2 °C)   SpO2: 99%   Weight: 52.2 kg (115 lb)   Height: 5' 5\" (1.651 m)            Physical Exam   Constitutional: She is oriented to person, place, and time. She appears well-developed and well-nourished. No distress. HENT:   Head: Normocephalic and atraumatic. Right Ear: External ear normal.   Left Ear: External ear normal.   Nose: Nose normal.   Mouth/Throat: Oropharynx is clear and moist. No oropharyngeal exudate. Cardiovascular: Normal rate, regular rhythm and normal heart sounds. Pulmonary/Chest: Effort normal and breath sounds normal. No respiratory distress. She has no wheezes. She has no rales. She exhibits no tenderness. Abdominal: Soft. Bowel sounds are normal. She exhibits no distension. There is no tenderness. There is no rebound and no guarding. Neurological: She is alert and oriented to person, place, and time. Skin: Skin is warm. She is not diaphoretic. Psychiatric: She has a normal mood and affect.  Her behavior is normal. Judgment and thought content normal.        MDM  Number of Diagnoses or Management Options  Cutaneous abscess of face:   Diagnosis management comments: Pt already having discharge from the site. Given IV clindamycin one dose here and recommend follow up with oral surgeon as OP including use warm compression on the site every 4-5 hours. Will place on oral clindamycin as well. Spoke to nurse and advised to get CBC as well. ED Course       Procedures  Recent Results (from the past 12 hour(s))   CBC WITH AUTOMATED DIFF    Collection Time: 08/06/17  2:10 PM   Result Value Ref Range    WBC 12.5 4.6 - 13.2 K/uL    RBC 3.90 (L) 4.20 - 5.30 M/uL    HGB 12.7 12.0 - 16.0 g/dL    HCT 38.7 35.0 - 45.0 %    MCV 99.2 (H) 74.0 - 97.0 FL    MCH 32.6 24.0 - 34.0 PG    MCHC 32.8 31.0 - 37.0 g/dL    RDW 12.6 11.6 - 14.5 %    PLATELET 853 328 - 528 K/uL    MPV 8.8 (L) 9.2 - 11.8 FL    NEUTROPHILS 60 40 - 73 %    LYMPHOCYTES 30 21 - 52 %    MONOCYTES 8 3 - 10 %    EOSINOPHILS 1 0 - 5 %    BASOPHILS 1 0 - 2 %    ABS. NEUTROPHILS 7.5 1.8 - 8.0 K/UL    ABS. LYMPHOCYTES 3.8 (H) 0.9 - 3.6 K/UL    ABS. MONOCYTES 1.0 0.05 - 1.2 K/UL    ABS. EOSINOPHILS 0.1 0.0 - 0.4 K/UL    ABS. BASOPHILS 0.1 (H) 0.0 - 0.06 K/UL    DF AUTOMATED           DISCHARGE NOTE:  2:00 PM    Lelia Perez's  results have been reviewed with her. She has been counseled regarding her diagnosis, treatment, and plan. She verbally conveys understanding and agreement of the signs, symptoms, diagnosis, treatment and prognosis and additionally agrees to follow up as discussed. She also agrees with the care-plan and conveys that all of her questions have been answered. I have also provided discharge instructions for her that include: educational information regarding their diagnosis and treatment, and list of reasons why they would want to return to the ED prior to their follow-up appointment, should her condition change. CLINICAL IMPRESSION:    1.  Cutaneous abscess of face        AFTER VISIT PLAN:    Current Discharge Medication List      START taking these medications    Details   clindamycin (CLEOCIN) 300 mg capsule Take 1 Cap by mouth four (4) times daily for 10 days. Qty: 40 Cap, Refills: 0      traMADol (ULTRAM) 50 mg tablet Take 1 Tab by mouth every six (6) hours as needed for Pain. Max Daily Amount: 200 mg.   Qty: 15 Tab, Refills: 0              Follow-up Information     Follow up With Details Comments Contact Info    Marlene Heck MD In 2 days please follow up with them as part of ER follow up.  [address] [facility] EMERGENCY DEPT  If symptoms worsen [address] [facility]  [phone]           Written by Margarita Ortiz PA-C

## 2017-08-06 NOTE — ED TRIAGE NOTES
Patient c/o abscess to right side of face. States purulent drainage from face. No appreciable drainage noted. Induration noted to face proximal to right side of mouth.

## 2017-09-13 ENCOUNTER — HOSPITAL ENCOUNTER (EMERGENCY)
Age: 62
Discharge: HOME | End: 2017-09-13
Attending: EMERGENCY MEDICINE
Payer: MEDICARE

## 2017-09-13 ENCOUNTER — APPOINTMENT (OUTPATIENT)
Dept: GENERAL RADIOLOGY | Age: 62
End: 2017-09-13
Attending: EMERGENCY MEDICINE
Payer: MEDICARE

## 2017-09-13 VITALS
HEART RATE: 79 BPM | DIASTOLIC BLOOD PRESSURE: 65 MMHG | OXYGEN SATURATION: 100 % | SYSTOLIC BLOOD PRESSURE: 113 MMHG | TEMPERATURE: 98.3 F | BODY MASS INDEX: 19.49 KG/M2 | WEIGHT: 117 LBS | HEIGHT: 65 IN | RESPIRATION RATE: 20 BRPM

## 2017-09-13 DIAGNOSIS — J20.9 ACUTE BRONCHITIS, UNSPECIFIED ORGANISM: Primary | ICD-10-CM

## 2017-09-13 LAB
ALBUMIN SERPL-MCNC: 4.1 G/DL (ref 3.4–5)
ALBUMIN/GLOB SERPL: 1.3 {RATIO} (ref 0.8–1.7)
ALP SERPL-CCNC: 135 U/L (ref 45–117)
ALT SERPL-CCNC: 40 U/L (ref 13–56)
ANION GAP SERPL CALC-SCNC: 3 MMOL/L (ref 3–18)
APPEARANCE UR: CLEAR
AST SERPL-CCNC: 30 U/L (ref 15–37)
BASOPHILS # BLD: 0 K/UL (ref 0–0.06)
BASOPHILS NFR BLD: 0 % (ref 0–2)
BILIRUB SERPL-MCNC: 0.3 MG/DL (ref 0.2–1)
BILIRUB UR QL: NEGATIVE
BUN SERPL-MCNC: 12 MG/DL (ref 7–18)
BUN/CREAT SERPL: 14 (ref 12–20)
CALCIUM SERPL-MCNC: 9.2 MG/DL (ref 8.5–10.1)
CHLORIDE SERPL-SCNC: 104 MMOL/L (ref 100–108)
CO2 SERPL-SCNC: 34 MMOL/L (ref 21–32)
COLOR UR: YELLOW
CREAT SERPL-MCNC: 0.85 MG/DL (ref 0.6–1.3)
DIFFERENTIAL METHOD BLD: ABNORMAL
EOSINOPHIL # BLD: 0 K/UL (ref 0–0.4)
EOSINOPHIL NFR BLD: 1 % (ref 0–5)
ERYTHROCYTE [DISTWIDTH] IN BLOOD BY AUTOMATED COUNT: 13.2 % (ref 11.6–14.5)
GLOBULIN SER CALC-MCNC: 3.2 G/DL (ref 2–4)
GLUCOSE SERPL-MCNC: 120 MG/DL (ref 74–99)
GLUCOSE UR STRIP.AUTO-MCNC: NEGATIVE MG/DL
HCT VFR BLD AUTO: 39.7 % (ref 35–45)
HGB BLD-MCNC: 13 G/DL (ref 12–16)
HGB UR QL STRIP: NEGATIVE
KETONES UR QL STRIP.AUTO: NEGATIVE MG/DL
LEUKOCYTE ESTERASE UR QL STRIP.AUTO: NEGATIVE
LYMPHOCYTES # BLD: 2 K/UL (ref 0.9–3.6)
LYMPHOCYTES NFR BLD: 28 % (ref 21–52)
MCH RBC QN AUTO: 32.3 PG (ref 24–34)
MCHC RBC AUTO-ENTMCNC: 32.7 G/DL (ref 31–37)
MCV RBC AUTO: 98.8 FL (ref 74–97)
MONOCYTES # BLD: 0.5 K/UL (ref 0.05–1.2)
MONOCYTES NFR BLD: 6 % (ref 3–10)
NEUTS SEG # BLD: 4.7 K/UL (ref 1.8–8)
NEUTS SEG NFR BLD: 65 % (ref 40–73)
NITRITE UR QL STRIP.AUTO: NEGATIVE
PH UR STRIP: 6.5 [PH] (ref 5–8)
PLATELET # BLD AUTO: 255 K/UL (ref 135–420)
PMV BLD AUTO: 8.7 FL (ref 9.2–11.8)
POTASSIUM SERPL-SCNC: 4.2 MMOL/L (ref 3.5–5.5)
PROT SERPL-MCNC: 7.3 G/DL (ref 6.4–8.2)
PROT UR STRIP-MCNC: NEGATIVE MG/DL
RBC # BLD AUTO: 4.02 M/UL (ref 4.2–5.3)
SODIUM SERPL-SCNC: 141 MMOL/L (ref 136–145)
SP GR UR REFRACTOMETRY: 1.01 (ref 1–1.03)
UROBILINOGEN UR QL STRIP.AUTO: 0.2 EU/DL (ref 0.2–1)
WBC # BLD AUTO: 7.2 K/UL (ref 4.6–13.2)

## 2017-09-13 PROCEDURE — A9270 NON-COVERED ITEM OR SERVICE: HCPCS | Performed by: NURSE PRACTITIONER

## 2017-09-13 PROCEDURE — 94640 AIRWAY INHALATION TREATMENT: CPT

## 2017-09-13 PROCEDURE — 74011636637 HC RX REV CODE- 636/637: Performed by: NURSE PRACTITIONER

## 2017-09-13 PROCEDURE — 77030029684 HC NEB SM VOL KT MONA -A

## 2017-09-13 PROCEDURE — 74011000250 HC RX REV CODE- 250: Performed by: NURSE PRACTITIONER

## 2017-09-13 PROCEDURE — 80053 COMPREHEN METABOLIC PANEL: CPT | Performed by: NURSE PRACTITIONER

## 2017-09-13 PROCEDURE — 71020 XR CHEST PA LAT: CPT

## 2017-09-13 PROCEDURE — 99283 EMERGENCY DEPT VISIT LOW MDM: CPT

## 2017-09-13 PROCEDURE — 81003 URINALYSIS AUTO W/O SCOPE: CPT | Performed by: NURSE PRACTITIONER

## 2017-09-13 PROCEDURE — 85025 COMPLETE CBC W/AUTO DIFF WBC: CPT | Performed by: NURSE PRACTITIONER

## 2017-09-13 RX ORDER — IPRATROPIUM BROMIDE AND ALBUTEROL SULFATE 2.5; .5 MG/3ML; MG/3ML
3 SOLUTION RESPIRATORY (INHALATION)
Status: COMPLETED | OUTPATIENT
Start: 2017-09-13 | End: 2017-09-13

## 2017-09-13 RX ORDER — ALBUTEROL SULFATE 90 UG/1
2 AEROSOL, METERED RESPIRATORY (INHALATION)
Qty: 1 INHALER | Refills: 0 | Status: SHIPPED | OUTPATIENT
Start: 2017-09-13 | End: 2021-08-18

## 2017-09-13 RX ORDER — PREDNISONE 20 MG/1
60 TABLET ORAL
Status: COMPLETED | OUTPATIENT
Start: 2017-09-13 | End: 2017-09-13

## 2017-09-13 RX ORDER — PREDNISONE 10 MG/1
TABLET ORAL
Qty: 21 TAB | Refills: 0 | Status: SHIPPED | OUTPATIENT
Start: 2017-09-13 | End: 2018-12-03

## 2017-09-13 RX ADMIN — IPRATROPIUM BROMIDE AND ALBUTEROL SULFATE 3 ML: .5; 3 SOLUTION RESPIRATORY (INHALATION) at 13:09

## 2017-09-13 RX ADMIN — PREDNISONE 60 MG: 20 TABLET ORAL at 13:08

## 2017-09-13 NOTE — ED NOTES
Pt states symptoms x about 1 week. [facility], given unknown abx, states developed rash, and dizziness, told by pharmacy to stop abx, pt states called [facility] informed to follow up with PCP, pt states PCP not available so came here.

## 2017-09-13 NOTE — DISCHARGE INSTRUCTIONS
Bronchitis: Care Instructions  Your Care Instructions    Bronchitis is inflammation of the bronchial tubes, which carry air to the lungs. The tubes swell and produce mucus, or phlegm. The mucus and inflamed bronchial tubes make you cough. You may have trouble breathing. Most cases of bronchitis are caused by viruses like those that cause colds. Antibiotics usually do not help and they may be harmful. Bronchitis usually develops rapidly and lasts about 2 to 3 weeks in otherwise healthy people. Follow-up care is a key part of your treatment and safety. Be sure to make and go to all appointments, and call your doctor if you are having problems. It's also a good idea to know your test results and keep a list of the medicines you take. How can you care for yourself at home? · Take all medicines exactly as prescribed. Call your doctor if you think you are having a problem with your medicine. · Get some extra rest.  · Take an over-the-counter pain medicine, such as acetaminophen (Tylenol), ibuprofen (Advil, Motrin), or naproxen (Aleve) to reduce fever and relieve body aches. Read and follow all instructions on the label. · Do not take two or more pain medicines at the same time unless the doctor told you to. Many pain medicines have acetaminophen, which is Tylenol. Too much acetaminophen (Tylenol) can be harmful. · Take an over-the-counter cough medicine that contains dextromethorphan to help quiet a dry, hacking cough so that you can sleep. Avoid cough medicines that have more than one active ingredient. Read and follow all instructions on the label. · Breathe moist air from a humidifier, hot shower, or sink filled with hot water. The heat and moisture will thin mucus so you can cough it out. · Do not smoke. Smoking can make bronchitis worse. If you need help quitting, talk to your doctor about stop-smoking programs and medicines. These can increase your chances of quitting for good.   When should you call for help? Call 911 anytime you think you may need emergency care. For example, call if:  · You have severe trouble breathing. Call your doctor now or seek immediate medical care if:  · You have new or worse trouble breathing. · You cough up dark brown or bloody mucus (sputum). · You have a new or higher fever. · You have a new rash. Watch closely for changes in your health, and be sure to contact your doctor if:  · You cough more deeply or more often, especially if you notice more mucus or a change in the color of your mucus. · You are not getting better as expected. Where can you learn more? Go to http://avril-brody.info/. Enter H333 in the search box to learn more about \"Bronchitis: Care Instructions. \"  Current as of: March 25, 2017  Content Version: 11.3  © 2428-8880 [employer]. Care instructions adapted under license by [employer] (which disclaims liability or warranty for this information). If you have questions about a medical condition or this instruction, always ask your healthcare professional. [address] any warranty or liability for your use of this information. [employer] Activation    Thank you for requesting access to [employer]. Please follow the instructions below to securely access and download your online medical record. [employer] allows you to send messages to your doctor, view your test results, renew your prescriptions, schedule appointments, and more. How Do I Sign Up? 1. In your internet browser, go to [email or web address]  2. Click on the First Time User? Click Here link in the Sign In box. You will be redirect to the New Member Sign Up page. 3. Enter your [employer] Access Code exactly as it appears below. You will not need to use this code after youve completed the sign-up process. If you do not sign up before the expiration date, you must request a new code.     [employer] Access Code: [email or web address]  Expires: 10/6/2017  1:54 PM (This is the date your [employer] access code will )    4. Enter the last four digits of your Social Security Number (xxxx) and Date of Birth (mm/dd/yyyy) as indicated and click Submit. You will be taken to the next sign-up page. 5. Create a [employer] ID. This will be your [employer] login ID and cannot be changed, so think of one that is secure and easy to remember. 6. Create a [employer] password. You can change your password at any time. 7. Enter your Password Reset Question and Answer. This can be used at a later time if you forget your password. 8. Enter your e-mail address. You will receive e-mail notification when new information is available in [address] 9. Click Sign Up. You can now view and download portions of your medical record. 10. Click the Download Summary menu link to download a portable copy of your medical information. Additional Information    If you have questions, please visit the Frequently Asked Questions section of the [employer] website at [email or web address] Remember, [employer] is NOT to be used for urgent needs. For medical emergencies, dial 911.

## 2017-09-13 NOTE — ED PROVIDER NOTES
HPI Comments: Lisset Marshall is a 65year old female with a PMHX GERD, Hypercholesterolemia, Anxiety, and Walking Pneumonia arrived to ER c/o intermittent fevers, chills, cough, and congestion x 1 1/2 weeks. She verbalizes her fever has been no higher than 100.1. However, she reports hasn't had a fever in over 48 hours. Patient states she was seen at [facility] last week and prescribed ABX (unable to recall name) but stopped it after three days because she got dizzy and gave her a rash. Patient reports she has been coughing up thick yellow phlegm for the past two days. She reports was a former smoker. Patient verbalizes she has been tolerating po fluids and solids well. Denies headache, neck pain/stiffness, dizziness, rashes, CP, SOB at rest or on exertion, abdominal pain, N/V/D, urinary sx's, or any other concerns. Patient is a 65 y.o. female presenting with cough, fever, and fatigue. The history is provided by the patient. Cough   Pertinent negatives include no chest pain, no chills, no shortness of breath, no wheezing, no nausea and no vomiting. Fever    Associated symptoms include cough. Pertinent negatives include no chest pain, no diarrhea, no vomiting and no shortness of breath. Fatigue   Pertinent negatives include no shortness of breath, no chest pain, no vomiting, no choking and no nausea.         Past Medical History:   Diagnosis Date    Anxiety 8/26/2010    GERD (gastroesophageal reflux disease)     Grief reaction 8/26/2010    Headache     Headache disorder 8/26/2010    Hypercholesterolemia     Pure hypercholesterolemia 8/26/2010    Walking pneumonia        Past Surgical History:   Procedure Laterality Date    HX COLONOSCOPY           Family History:   Problem Relation Age of Onset    Hypertension Father     Elevated Lipids Father     Heart Disease Father        Social History     Social History    Marital status:      Spouse name: N/A    Number of children: N/A  Years of education: N/A     Occupational History    Not on file. Social History Main Topics    Smoking status: Current Some Day Smoker     Packs/day: 0.25     Years: 30.00    Smokeless tobacco: Never Used    Alcohol use No    Drug use: No    Sexual activity: Not Currently     Partners: Male     Other Topics Concern    Not on file     Social History Narrative         ALLERGIES: Aspirin; Darvocet a500 [propoxyphene n-acetaminophen]; Midrin [[id number]-eptgfldxv-xbliycqmnrmp]; Neurontin [gabapentin]; Ultram [tramadol]; and Codeine    Review of Systems   Constitutional: Positive for fatigue and fever. Negative for activity change, appetite change and chills. HENT: Negative. Eyes: Negative. Respiratory: Positive for cough. Negative for choking, chest tightness, shortness of breath, wheezing and stridor. Cardiovascular: Negative. Negative for chest pain. Gastrointestinal: Negative. Negative for abdominal distention, abdominal pain, blood in stool, constipation, diarrhea, nausea and vomiting. Genitourinary: Negative. Musculoskeletal: Negative. Skin: Negative. Neurological: Negative. Vitals:    09/13/17 1146   BP: 113/65   Pulse: 79   Resp: 20   Temp: 98.3 °F (36.8 °C)   SpO2: 100%   Weight: 53.1 kg (117 lb)   Height: 5' 5\" (1.651 m)            Physical Exam   Constitutional: She is oriented to person, place, and time. She appears well-developed and well-nourished. No distress. HENT:   Right Ear: Hearing, tympanic membrane, external ear and ear canal normal.   Left Ear: Hearing, tympanic membrane, external ear and ear canal normal.   Nose: Nose normal.   Mouth/Throat: Uvula is midline, oropharynx is clear and moist and mucous membranes are normal.   Cardiovascular: Normal rate, regular rhythm and normal heart sounds. Exam reveals no gallop and no friction rub. No murmur heard. Pulmonary/Chest: Effort normal. No accessory muscle usage. No respiratory distress.  She has no decreased breath sounds. She has no wheezes. She has rhonchi (very faint) in the right lower field and the left lower field. She has no rales. Abdominal: Soft. Bowel sounds are normal. She exhibits no distension and no mass. There is no tenderness. There is no rebound and no guarding. Musculoskeletal: Normal range of motion. Neurological: She is alert and oriented to person, place, and time. Skin: Skin is warm and dry. She is not diaphoretic. Psychiatric: She has a normal mood and affect. Her behavior is normal. Judgment and thought content normal.   Nursing note and vitals reviewed. MDM  Number of Diagnoses or Management Options  Acute bronchitis, unspecified organism:   Diagnosis management comments: DDx:  URI  Bronchitis  Pneumonia  Influenza  COPD Exacerbation  Asthma Exacerbation    Clinical Impression/plan: Patient stable condition. Reviewed diagnostic results with patient. Answered questions. Will prescribe Prednisone and albuterol inhaler. Follow up with PCP in 2-4 days. If symptoms worsen or have any other concerns, return to ER. Patient verbalizes d/c instructions. Amount and/or Complexity of Data Reviewed  Clinical lab tests: ordered and reviewed  Tests in the radiology section of CPT®: ordered and reviewed  Review and summarize past medical records: yes    Risk of Complications, Morbidity, and/or Mortality  Presenting problems: low  Diagnostic procedures: low  Management options: low      ED Course       Procedures      Diagnosis:   1. Acute bronchitis, unspecified organism          Disposition: Home    Follow-up Information     Follow up With Details Comments Contact Luisa Kruse MD Schedule an appointment as soon as possible for a visit in 2 days  [facility] [id number]  [phone]      Return to ER immediately for any worsening symptoms or concerns.               Patient's Medications   Start Taking    ALBUTEROL (PROVENTIL HFA, VENTOLIN HFA, PROAIR HFA) 90 MCG/ACTUATION INHALER    Take 2 Puffs by inhalation every four (4) hours as needed for Wheezing. PREDNISONE (STERAPRED DS) 10 MG DOSE PACK    Take as directed until completion   Continue Taking    CLONAZEPAM (KLONOPIN) 0.5 MG TABLET    Take 1 Tab by mouth two (2) times a day. Max Daily Amount: 1 mg. Indications: PANIC DISORDER    FUROSEMIDE (LASIX) 20 MG TABLET    Take one tablet by mouth every day. Indications: hypertension    HYDROXYZINE PAMOATE (VISTARIL) 25 MG CAPSULE    Take 1 Cap by mouth every six (6) hours as needed for Anxiety. MULTIVITAMIN (ONE A DAY) TABLET    Take 1 Tab by mouth daily. OMEPRAZOLE (PRILOSEC) 40 MG CAPSULE    Take 40 mg by mouth daily. PAROXETINE (PAXIL) 40 MG TABLET    Take 0.5 Tabs by mouth daily. Indications: ANXIETY WITH DEPRESSION    SIMVASTATIN (ZOCOR) 40 MG TABLET    Take 1 Tab by mouth nightly. TRAMADOL (ULTRAM) 50 MG TABLET    Take 1 Tab by mouth every six (6) hours as needed for Pain. Max Daily Amount: 200 mg. These Medications have changed    No medications on file   Stop Taking    ALBUTEROL SULFATE (PROVENTIL;VENTOLIN) 2.5 MG/0.5 ML NEBU NEBULIZER SOLUTION    by Nebulization route once.

## 2017-09-13 NOTE — ED TRIAGE NOTES
Patient states onset of cough, fever, and fatigue with onset on Saturday. States taking Tylenol today.

## 2018-11-11 ENCOUNTER — HOSPITAL ENCOUNTER (EMERGENCY)
Age: 63
Discharge: HOME | End: 2018-11-12
Attending: EMERGENCY MEDICINE
Payer: MEDICARE

## 2018-11-11 DIAGNOSIS — R41.82 ALTERED MENTAL STATUS, UNSPECIFIED ALTERED MENTAL STATUS TYPE: Primary | ICD-10-CM

## 2018-11-11 PROCEDURE — 99284 EMERGENCY DEPT VISIT MOD MDM: CPT

## 2018-11-11 RX ORDER — SODIUM CHLORIDE 9 MG/ML
500 INJECTION, SOLUTION INTRAVENOUS ONCE
Status: COMPLETED | OUTPATIENT
Start: 2018-11-12 | End: 2018-11-12

## 2018-11-12 ENCOUNTER — APPOINTMENT (OUTPATIENT)
Dept: CT IMAGING | Age: 63
End: 2018-11-12
Attending: EMERGENCY MEDICINE
Payer: MEDICARE

## 2018-11-12 VITALS
HEART RATE: 81 BPM | OXYGEN SATURATION: 100 % | BODY MASS INDEX: 19.84 KG/M2 | WEIGHT: 112 LBS | TEMPERATURE: 98 F | HEIGHT: 63 IN | DIASTOLIC BLOOD PRESSURE: 68 MMHG | RESPIRATION RATE: 16 BRPM | SYSTOLIC BLOOD PRESSURE: 122 MMHG

## 2018-11-12 LAB
ANION GAP SERPL CALC-SCNC: 6 MMOL/L (ref 3–18)
APAP SERPL-MCNC: <2 UG/ML (ref 10–30)
ARTERIAL PATENCY WRIST A: YES
BASE EXCESS BLDV CALC-SCNC: 8 MMOL/L
BASOPHILS # BLD: 0.1 K/UL (ref 0–0.1)
BASOPHILS NFR BLD: 1 % (ref 0–2)
BDY SITE: ABNORMAL
BODY TEMPERATURE: 98.1
BUN SERPL-MCNC: 19 MG/DL (ref 7–18)
BUN/CREAT SERPL: 21 (ref 12–20)
CALCIUM SERPL-MCNC: 8.8 MG/DL (ref 8.5–10.1)
CHLORIDE SERPL-SCNC: 102 MMOL/L (ref 100–108)
CK MB CFR SERPL CALC: 1.9 % (ref 0–4)
CK MB SERPL-MCNC: 1.5 NG/ML (ref 5–25)
CK SERPL-CCNC: 78 U/L (ref 26–192)
CO2 SERPL-SCNC: 35 MMOL/L (ref 21–32)
CREAT SERPL-MCNC: 0.89 MG/DL (ref 0.6–1.3)
DIFFERENTIAL METHOD BLD: ABNORMAL
EOSINOPHIL # BLD: 0.1 K/UL (ref 0–0.4)
EOSINOPHIL NFR BLD: 1 % (ref 0–5)
ERYTHROCYTE [DISTWIDTH] IN BLOOD BY AUTOMATED COUNT: 15.2 % (ref 11.6–14.5)
ETHANOL SERPL-MCNC: <3 MG/DL (ref 0–3)
GAS FLOW.O2 O2 DELIVERY SYS: ABNORMAL L/MIN
GLUCOSE SERPL-MCNC: 91 MG/DL (ref 74–99)
HCO3 BLDV-SCNC: 33.7 MMOL/L (ref 23–28)
HCT VFR BLD AUTO: 38.3 % (ref 35–45)
HGB BLD-MCNC: 12.3 G/DL (ref 12–16)
LYMPHOCYTES # BLD: 1.3 K/UL (ref 0.9–3.6)
LYMPHOCYTES NFR BLD: 12 % (ref 21–52)
MCH RBC QN AUTO: 32.4 PG (ref 24–34)
MCHC RBC AUTO-ENTMCNC: 32.1 G/DL (ref 31–37)
MCV RBC AUTO: 100.8 FL (ref 74–97)
MONOCYTES # BLD: 1 K/UL (ref 0.05–1.2)
MONOCYTES NFR BLD: 9 % (ref 3–10)
NEUTS SEG # BLD: 8.7 K/UL (ref 1.8–8)
NEUTS SEG NFR BLD: 77 % (ref 40–73)
O2/TOTAL GAS SETTING VFR VENT: 21 %
PCO2 BLDV: 49.3 MMHG (ref 41–51)
PH BLDV: 7.44 [PH] (ref 7.32–7.42)
PLATELET # BLD AUTO: 292 K/UL (ref 135–420)
PMV BLD AUTO: 8.8 FL (ref 9.2–11.8)
PO2 BLDV: 59 MMHG (ref 25–40)
POTASSIUM SERPL-SCNC: 3.8 MMOL/L (ref 3.5–5.5)
RBC # BLD AUTO: 3.8 M/UL (ref 4.2–5.3)
SALICYLATES SERPL-MCNC: 4.3 MG/DL (ref 2.8–20)
SAO2 % BLDV: 91 % (ref 65–88)
SERVICE CMNT-IMP: ABNORMAL
SODIUM SERPL-SCNC: 143 MMOL/L (ref 136–145)
SPECIMEN TYPE: ABNORMAL
TOTAL RESP. RATE, ITRR: 20
TROPONIN I SERPL-MCNC: <0.02 NG/ML (ref 0–0.06)
WBC # BLD AUTO: 11.1 K/UL (ref 4.6–13.2)

## 2018-11-12 PROCEDURE — 80048 BASIC METABOLIC PNL TOTAL CA: CPT

## 2018-11-12 PROCEDURE — 74011250636 HC RX REV CODE- 250/636: Performed by: EMERGENCY MEDICINE

## 2018-11-12 PROCEDURE — 70450 CT HEAD/BRAIN W/O DYE: CPT

## 2018-11-12 PROCEDURE — 96360 HYDRATION IV INFUSION INIT: CPT

## 2018-11-12 PROCEDURE — 85025 COMPLETE CBC W/AUTO DIFF WBC: CPT

## 2018-11-12 PROCEDURE — 82553 CREATINE MB FRACTION: CPT

## 2018-11-12 PROCEDURE — 82803 BLOOD GASES ANY COMBINATION: CPT

## 2018-11-12 PROCEDURE — 80307 DRUG TEST PRSMV CHEM ANLYZR: CPT

## 2018-11-12 RX ADMIN — SODIUM CHLORIDE 500 ML: 900 INJECTION, SOLUTION INTRAVENOUS at 00:28

## 2018-11-12 NOTE — ED TRIAGE NOTES
Pt c/o ground level fall, hurt her hip, denies LOC, Alert and oriented x 3-4 at the ED, no pain at this time. FYI: daughter claimed that pt took 4 of her [address] today.

## 2018-11-12 NOTE — DISCHARGE INSTRUCTIONS
Return for pain, any confusion, fever not resolving with motrin or tylenol, shortness of breath, vomiting, decreased fluid intake, weakness, numbness, dizziness, or any change or concerns. Stay with family for 24 hours as we discussed. Altered Mental Status: Care Instructions  Your Care Instructions  Altered mental status is a change in how well your brain is working. As a result, you may be confused, be less alert than usual, or act in odd ways. This may include seeing or hearing things that aren't really there (hallucinations). A mental status change has many possible causes. For example, it may be the result of an infection, an imbalance of chemicals in the body, or a chronic disease such as diabetes or COPD. It can also be caused by things such as a head injury, taking certain medicines, or using alcohol or drugs. The doctor may do tests to look for the cause. These tests may include urine tests, blood tests, and imaging tests such as a CT scan. Sometimes a clear cause isn't found. But tests can help the doctor rule out a serious cause of your symptoms. A change in mental status can be scary. But mental status will often return to normal when the cause is treated. So it is important to get any follow-up testing or treatment the doctor has suggested. The doctor has checked you carefully, but problems can develop later. If you notice any problems or new symptoms, get medical treatment right away. Follow-up care is a key part of your treatment and safety. Be sure to make and go to all appointments, and call your doctor if you are having problems. It's also a good idea to know your test results and keep a list of the medicines you take. How can you care for yourself at home? · Be safe with medicines. Take your medicines exactly as prescribed. Call your doctor if you think you are having a problem with your medicine. · Have another adult stay with you until you are better.  This can help keep you safe. Ask that person to watch for signs that your mental status is getting worse. When should you call for help? Call 911 anytime you think you may need emergency care. For example, call if:  · You passed out (lost consciousness). Call your doctor now or seek immediate medical care if:  · Your mental status is getting worse. · You have new symptoms, such as a fever, chills, or shortness of breath. · You do not feel safe. Watch closely for changes in your health, and be sure to contact your doctor if:  · You do not get better as expected. Where can you learn more? Go to [employer].be  Enter J452 in the search box to learn more about \"Altered Mental Status: Care Instructions. \"   © 3340-1246 [employer]wise[employer]. Care instructions adapted under license by [employer] (which disclaims liability or warranty for this information). This care instruction is for use with your licensed healthcare professional. If you have questions about a medical condition or this instruction, always ask your healthcare professional. [place] [address] any warranty or liability for your use of this information.   Content Version: 29.5.264693; Current as of: November 20, 2015

## 2018-11-12 NOTE — ED PROVIDER NOTES
EMERGENCY DEPARTMENT HISTORY AND PHYSICAL EXAM 
 
11:58 PM 
 
 
Date: 11/11/2018 Patient Name: Nida Christine History of Presenting Illness Chief Complaint Patient presents with  Fall  Hip Pain History Provided By: Patient and Patient's daughter Chief Complaint: Claudeen Gaudy Duration:  Few hours ago Timing:  n/a Location:  n/a Quality: mechanical 
Severity: n/a Modifying Factors: No modifying or aggravating factors were reported. Associated Symptoms: No associated symptoms Additional History (Context): 11:59 PM Nida Christine is a 59 y.o. female with h/o anxiety  who presents to ED for evaluation after a mechanical fall a couple hours ago. Patient's daughter states that the patient has been intermittently confused since 1 pm. Patient reports that last night she was in a fog and called someone, but has no recollection of it. No previous UTIs, not on any current abx. No etoh, illicit drugs. Daughter states that patient may have taken too many Wellbutrin, possibly 2 extra pills accidentally over last 24 hours, but not sure. Denies SI or previous attempts. No modifying or aggravating factors were reported. No other concerns or symptoms at this time. PCP: Andrew White MD 
 
Current Outpatient Medications Medication Sig Dispense Refill  albuterol (PROVENTIL HFA, VENTOLIN HFA, PROAIR HFA) 90 mcg/actuation inhaler Take 2 Puffs by inhalation every four (4) hours as needed for Wheezing. 1 Inhaler 0  predniSONE (STERAPRED DS) 10 mg dose pack Take as directed until completion 21 Tab 0  
 traMADol (ULTRAM) 50 mg tablet Take 1 Tab by mouth every six (6) hours as needed for Pain. Max Daily Amount: 200 mg. 15 Tab 0  
 PARoxetine (PAXIL) 40 mg tablet Take 0.5 Tabs by mouth daily. Indications: ANXIETY WITH DEPRESSION 30 Tab 0  clonazePAM (KLONOPIN) 0.5 mg tablet Take 1 Tab by mouth two (2) times a day. Max Daily Amount: 1 mg.  Indications: PANIC DISORDER 60 Tab 0  
  furosemide (LASIX) 20 mg tablet Take one tablet by mouth every day. Indications: hypertension 30 Tab 0  
 hydrOXYzine pamoate (VISTARIL) 25 mg capsule Take 1 Cap by mouth every six (6) hours as needed for Anxiety. 10 Cap 0  
 multivitamin (ONE A DAY) tablet Take 1 Tab by mouth daily.  omeprazole (PRILOSEC) 40 mg capsule Take 40 mg by mouth daily.  simvastatin (ZOCOR) 40 mg tablet Take 1 Tab by mouth nightly. 30 Tab 3 Past History Past Medical History: 
Past Medical History:  
Diagnosis Date  Anxiety 8/26/2010  GERD (gastroesophageal reflux disease)  Grief reaction 8/26/2010  
 Headache disorder 8/26/2010  
 Headache(784.0)  Hypercholesterolemia  Pure hypercholesterolemia 8/26/2010  Walking pneumonia Past Surgical History: 
Past Surgical History:  
Procedure Laterality Date  HX COLONOSCOPY Family History: 
Family History Problem Relation Age of Onset  Hypertension Father  Elevated Lipids Father  Heart Disease Father Social History: 
Social History Tobacco Use  Smoking status: Current Some Day Smoker Packs/day: 0.25 Years: 30.00 Pack years: 7.50  Smokeless tobacco: Never Used Substance Use Topics  Alcohol use: No  
 Drug use: No  
 
 
Allergies: Allergies Allergen Reactions  Aspirin Nausea and Vomiting  Darvocet A500 [Propoxyphene N-Acetaminophen] Drowsiness  Midrin [Pwcmapr-Udzkcgmgr-Piokuitohckb] Drowsiness  Neurontin [Gabapentin] Drowsiness  Ultram [Tramadol] Nausea Only  Codeine Nausea Only Review of Systems Review of Systems Constitutional: Negative for fever. HENT: Negative for congestion. Respiratory: Negative for cough and shortness of breath. Cardiovascular: Negative for chest pain. Gastrointestinal: Negative for abdominal pain and vomiting. Musculoskeletal: Negative for back pain. Skin: Negative for rash. Neurological: Negative for light-headedness. All other systems reviewed and are negative. Physical Exam  
 
Visit Vitals /68 (BP 1 Location: Left arm, BP Patient Position: At rest) Pulse 81 Temp 98 °F (36.7 °C) Resp 16 Ht 5' 3\" (1.6 m) Wt 50.8 kg (112 lb) SpO2 100% BMI 19.84 kg/m² Physical Exam  
Constitutional: She is oriented to person, place, and time. She appears well-developed. HENT:  
Head: Normocephalic. Mouth/Throat: Oropharynx is clear and moist.  
Eyes: Pupils are equal, round, and reactive to light. Neck: Normal range of motion. Cardiovascular: Normal rate and normal heart sounds. No murmur heard. Pulmonary/Chest: Effort normal. She has no wheezes. She has no rales. Abdominal: Soft. There is no tenderness. Musculoskeletal: Normal range of motion. She exhibits no edema. Neurological: She is alert and oriented to person, place, and time. Skin: Skin is warm and dry. Nursing note and vitals reviewed. Diagnostic Study Results Vitals: 
No data found. Medications ordered:  
Medications  
0.9% sodium chloride infusion 500 mL (0 mL IntraVENous IV Completed 11/12/18 0128) Lab findings: 
No results found for this or any previous visit (from the past 12 hour(s)). X-Ray, CT or other radiology findings or impressions: 
CT HEAD WO CONT Final Result Progress notes, Consult notes or additional Procedure notes:  
2:06 AM a and o x 3, requesting dc. Daughter agrees, says to be w pt x 24 hours, to monitor closely. No emc. Detailed ret inst given. To dc per pt/daughter request. Neg initial w/u.  rec staying for further ed obs/eval.  But to dc as requested. They verb und of return inst 
Disposition: 
Diagnosis: 1. Altered mental status, unspecified altered mental status type Disposition: home Follow-up Information Follow up With Specialties Details Why Contact Info [facility] Schedule an appointment as soon as possible for a visit in 1 day or your physician [address] [place] [address] 
[phone] [id number] [facility] EMERGENCY DEPT Emergency Medicine Go to As needed [place] [address] 
[phone] Medication List  
  
ASK your doctor about these medications   
albuterol 90 mcg/actuation inhaler Commonly known as:  PROVENTIL HFA, VENTOLIN HFA, PROAIR HFA Take 2 Puffs by inhalation every four (4) hours as needed for Wheezing. clonazePAM 0.5 mg tablet Commonly known as:  Lanney Running Take 1 Tab by mouth two (2) times a day. Max Daily Amount: 1 mg. Indications: PANIC DISORDER 
  
furosemide 20 mg tablet Commonly known as:  LASIX Take one tablet by mouth every day. Indications: hypertension 
  
hydrOXYzine pamoate 25 mg capsule Commonly known as:  VISTARIL Take 1 Cap by mouth every six (6) hours as needed for Anxiety. multivitamin tablet Commonly known as:  ONE A DAY 
  
omeprazole 40 mg capsule Commonly known as:  PRILOSEC PARoxetine 40 mg tablet Commonly known as:  PAXIL Take 0.5 Tabs by mouth daily. Indications: ANXIETY WITH DEPRESSION 
  
predniSONE 10 mg dose pack Commonly known as:  STERAPRED DS Take as directed until completion 
  
simvastatin 40 mg tablet Commonly known as:  ZOCOR Take 1 Tab by mouth nightly. traMADol 50 mg tablet Commonly known as:  ULTRAM 
Take 1 Tab by mouth every six (6) hours as needed for Pain. Max Daily Amount: 200 mg. 
  
  
 
 
 
Scribe Attestation Jayy Echeverria MD acting as a scribe for and in the presence of No att. providers found November 12, 2018 at 8:03 PM 
    
Provider Attestation:     
I personally performed the services described in the documentation, reviewed the documentation, as recorded by the scribe in my presence, and it accurately and completely records my words and actions. November 12, 2018 at 8:03 PM - No att. providers found

## 2018-12-03 ENCOUNTER — HOSPITAL ENCOUNTER (EMERGENCY)
Age: 63
Discharge: HOME | End: 2018-12-03
Attending: EMERGENCY MEDICINE
Payer: MEDICARE

## 2018-12-03 ENCOUNTER — APPOINTMENT (OUTPATIENT)
Dept: GENERAL RADIOLOGY | Age: 63
End: 2018-12-03
Attending: EMERGENCY MEDICINE
Payer: MEDICARE

## 2018-12-03 VITALS
WEIGHT: 110 LBS | BODY MASS INDEX: 18.33 KG/M2 | HEIGHT: 65 IN | HEART RATE: 87 BPM | DIASTOLIC BLOOD PRESSURE: 57 MMHG | RESPIRATION RATE: 18 BRPM | OXYGEN SATURATION: 97 % | TEMPERATURE: 99.3 F | SYSTOLIC BLOOD PRESSURE: 135 MMHG

## 2018-12-03 DIAGNOSIS — J18.9 COMMUNITY ACQUIRED PNEUMONIA OF RIGHT MIDDLE LOBE OF LUNG: Primary | ICD-10-CM

## 2018-12-03 PROCEDURE — 74011000250 HC RX REV CODE- 250: Performed by: EMERGENCY MEDICINE

## 2018-12-03 PROCEDURE — 99282 EMERGENCY DEPT VISIT SF MDM: CPT

## 2018-12-03 PROCEDURE — 77030029684 HC NEB SM VOL KT MONA -A

## 2018-12-03 PROCEDURE — 94640 AIRWAY INHALATION TREATMENT: CPT

## 2018-12-03 PROCEDURE — 71046 X-RAY EXAM CHEST 2 VIEWS: CPT

## 2018-12-03 RX ORDER — AZITHROMYCIN 250 MG/1
TABLET, FILM COATED ORAL
Qty: 6 TAB | Refills: 0 | Status: SHIPPED | OUTPATIENT
Start: 2018-12-03 | End: 2020-10-05

## 2018-12-03 RX ORDER — IPRATROPIUM BROMIDE AND ALBUTEROL SULFATE 2.5; .5 MG/3ML; MG/3ML
3 SOLUTION RESPIRATORY (INHALATION)
Status: COMPLETED | OUTPATIENT
Start: 2018-12-03 | End: 2018-12-03

## 2018-12-03 RX ORDER — IPRATROPIUM BROMIDE AND ALBUTEROL SULFATE 2.5; .5 MG/3ML; MG/3ML
3 SOLUTION RESPIRATORY (INHALATION)
Status: DISCONTINUED | OUTPATIENT
Start: 2018-12-03 | End: 2018-12-03

## 2018-12-03 RX ORDER — BENZONATATE 100 MG/1
100 CAPSULE ORAL
Qty: 21 CAP | Refills: 0 | Status: SHIPPED | OUTPATIENT
Start: 2018-12-03 | End: 2018-12-10

## 2018-12-03 RX ADMIN — IPRATROPIUM BROMIDE AND ALBUTEROL SULFATE 3 ML: .5; 3 SOLUTION RESPIRATORY (INHALATION) at 11:31

## 2018-12-03 NOTE — ED NOTES
Halle Garcia is a 59 y.o. female that was discharged in stable condition. The patients diagnosis, condition and treatment were explained to  patient and aftercare instructions were given. The patient verbalized understanding. Patient armband removed and shredded.

## 2018-12-03 NOTE — ED PROVIDER NOTES
The history is provided by the patient. Cough This is a new problem. Episode onset: 2 weeks ago. The problem occurs every few minutes. The problem has been gradually worsening. The cough is productive of purulent sputum. There has been a fever of 102 - 102.9 F. The fever has been present for 3 - 4 days. Associated symptoms include chills, rhinorrhea and wheezing. Pertinent negatives include no chest pain, no sweats, no weight loss, no eye redness, no ear congestion, no ear pain, no headaches, no sore throat, no myalgias, no shortness of breath, no nausea, no vomiting and no confusion. Treatments tried: tylenol. The treatment provided mild relief. She is a smoker. Her past medical history is significant for pneumonia. Her past medical history does not include bronchitis or asthma. Past Medical History:  
Diagnosis Date  Anxiety 8/26/2010  GERD (gastroesophageal reflux disease)  Grief reaction 8/26/2010  
 Headache disorder 8/26/2010  
 Headache(784.0)  Hypercholesterolemia  Pure hypercholesterolemia 8/26/2010  Walking pneumonia Past Surgical History:  
Procedure Laterality Date  HX COLONOSCOPY Family History:  
Problem Relation Age of Onset  Hypertension Father  Elevated Lipids Father  Heart Disease Father Social History Socioeconomic History  Marital status:  Spouse name: Not on file  Number of children: Not on file  Years of education: Not on file  Highest education level: Not on file Social Needs  Financial resource strain: Not on file  Food insecurity - worry: Not on file  Food insecurity - inability: Not on file  Transportation needs - medical: Not on file  Transportation needs - non-medical: Not on file Occupational History  Not on file Tobacco Use  Smoking status: Current Some Day Smoker Packs/day: 0.25 Years: 30.00 Pack years: 7.50  Smokeless tobacco: Never Used Substance and Sexual Activity  Alcohol use: No  
 Drug use: No  
 Sexual activity: Not Currently Partners: Male Other Topics Concern  Not on file Social History Narrative  Not on file ALLERGIES: Aspirin; Darvocet a500 [propoxyphene n-acetaminophen]; Midrin [xhltbfk-nykpeotty-owaqvxxkhtnm]; Neurontin [gabapentin]; Ultram [tramadol]; and Codeine Review of Systems Constitutional: Positive for chills. Negative for fever and weight loss. HENT: Positive for rhinorrhea. Negative for ear pain and sore throat. Eyes: Negative for pain and redness. Respiratory: Positive for cough and wheezing. Negative for shortness of breath. Cardiovascular: Negative for chest pain, palpitations and leg swelling. Gastrointestinal: Negative for abdominal pain, constipation, diarrhea, nausea and vomiting. Genitourinary: Negative for dysuria. Musculoskeletal: Negative for myalgias. Skin: Negative. Neurological: Negative for dizziness, light-headedness and headaches. Psychiatric/Behavioral: Negative. Negative for confusion. All other systems reviewed and are negative. Vitals:  
 12/03/18 1109 12/03/18 1235 BP: 130/69 Pulse:  80 Resp: 18 Temp: 99.3 °F (37.4 °C) SpO2: 96% 97% Weight: 49.9 kg (110 lb) Height: 5' 5\" (1.651 m) Physical Exam  
Constitutional: She is oriented to person, place, and time. She appears well-developed and well-nourished. No distress. HENT:  
Head: Normocephalic and atraumatic. Right Ear: Tympanic membrane, external ear and ear canal normal.  
Left Ear: Tympanic membrane, external ear and ear canal normal.  
Nose: Nose normal.  
Mouth/Throat: Oropharynx is clear and moist and mucous membranes are normal.  
Eyes: Conjunctivae and EOM are normal. Pupils are equal, round, and reactive to light. Neck: Normal range of motion. Neck supple.   
Cardiovascular: Normal rate, regular rhythm, normal heart sounds and intact distal pulses. Exam reveals no gallop and no friction rub. No murmur heard. Pulmonary/Chest: Effort normal. No stridor. No respiratory distress. She has no decreased breath sounds. She has wheezes in the right middle field and the right lower field. She has no rhonchi. She has no rales. Abdominal: Soft. Bowel sounds are normal. There is no tenderness. Musculoskeletal: Normal range of motion. She exhibits no edema or tenderness. Lymphadenopathy:  
  She has no cervical adenopathy. Neurological: She is alert and oriented to person, place, and time. Skin: Skin is warm and dry. Capillary refill takes less than 2 seconds. She is not diaphoretic. Psychiatric: She has a normal mood and affect. Her behavior is normal. Judgment and thought content normal.  
Nursing note and vitals reviewed. MDM Number of Diagnoses or Management Options Diagnosis management comments: DDx:  viral vs bacterial URI, asthma exacerbation, COPD, bronchitis, PNE, PE, allergies, pneumothorax, pleural effusion CXR with RML pneumonia, pt afebrile here, O2 97%, improved with duo neb tx. Feel pt appropriate for outpt tx. Will plan to rx azithromycin, tessalon, and pt states she already has albuterol inhaler at home. Pt has PCP appt sched for 2 days from now, pt to keep this. Strict ED return precautions given. Amount and/or Complexity of Data Reviewed Tests in the radiology section of CPT®: reviewed and ordered Procedures Diagnosis: 1. Community acquired pneumonia of right middle lobe of lung ([address] Disposition: Discharge to home. Follow-up Information Follow up With Specialties Details Why Contact Info [id number] [facility] EMERGENCY DEPT Emergency Medicine  As needed, If symptoms worsen [facility] [place] [address] 
[phone] Leila Murphy MD Pediatrics Go in 2 days  [address] [facility] [address] [facility] [address] [phone] Medication List  
  
START taking these medications   
azithromycin 250 mg tablet Commonly known as:  Merelias Jamal Take two tablets the first day and then one every day thereafter until completion 
  
benzonatate 100 mg capsule Commonly known as:  TESSALON PERLES Take 1 Cap by mouth three (3) times daily as needed for Cough for up to 7 days. CONTINUE taking these medications   
albuterol 90 mcg/actuation inhaler Commonly known as:  PROVENTIL HFA, VENTOLIN HFA, PROAIR HFA Take 2 Puffs by inhalation every four (4) hours as needed for Wheezing. furosemide 20 mg tablet Commonly known as:  LASIX Take one tablet by mouth every day. Indications: hypertension 
  
hydrOXYzine pamoate 25 mg capsule Commonly known as:  VISTARIL Take 1 Cap by mouth every six (6) hours as needed for Anxiety. multivitamin tablet Commonly known as:  ONE A DAY 
  
omeprazole 40 mg capsule Commonly known as:  PRILOSEC 
  
simvastatin 40 mg tablet Commonly known as:  ZOCOR Take 1 Tab by mouth nightly. Where to Get Your Medications Information about where to get these medications is not yet available Ask your nurse or doctor about these medications · azithromycin 250 mg tablet · benzonatate 100 mg capsule

## 2018-12-03 NOTE — DISCHARGE INSTRUCTIONS
Pneumonia: Care Instructions  Your Care Instructions    Pneumonia is an infection of the lungs. Most cases are caused by infections from bacteria or viruses. Pneumonia may be mild or very severe. If it is caused by bacteria, you will be treated with antibiotics. It may take a few weeks to a few months to recover fully from pneumonia, depending on how sick you were and whether your overall health is good. Follow-up care is a key part of your treatment and safety. Be sure to make and go to all appointments, and call your doctor if you are having problems. It's also a good idea to know your test results and keep a list of the medicines you take. How can you care for yourself at home? · Take your antibiotics exactly as directed. Do not stop taking the medicine just because you are feeling better. You need to take the full course of antibiotics. · Take your medicines exactly as prescribed. Call your doctor if you think you are having a problem with your medicine. · Get plenty of rest and sleep. You may feel weak and tired for a while, but your energy level will improve with time. · To prevent dehydration, drink plenty of fluids, enough so that your urine is light yellow or clear like water. Choose water and other caffeine-free clear liquids until you feel better. If you have kidney, heart, or liver disease and have to limit fluids, talk with your doctor before you increase the amount of fluids you drink. · Take care of your cough so you can rest. A cough that brings up mucus from your lungs is common with pneumonia. It is one way your body gets rid of the infection. But if coughing keeps you from resting or causes severe fatigue and chest-wall pain, talk to your doctor. He or she may suggest that you take a medicine to reduce the cough. · Use a vaporizer or humidifier to add moisture to your bedroom. Follow the directions for cleaning the machine. · Do not smoke or allow others to smoke around you.  Smoke will make your cough last longer. If you need help quitting, talk to your doctor about stop-smoking programs and medicines. These can increase your chances of quitting for good. · Take an over-the-counter pain medicine, such as acetaminophen (Tylenol), ibuprofen (Advil, Motrin), or naproxen (Aleve). Read and follow all instructions on the label. · Do not take two or more pain medicines at the same time unless the doctor told you to. Many pain medicines have acetaminophen, which is Tylenol. Too much acetaminophen (Tylenol) can be harmful. · If you were given a spirometer to measure how well your lungs are working, use it as instructed. This can help your doctor tell how your recovery is going. · To prevent pneumonia in the future, talk to your doctor about getting a flu vaccine (once a year) and a pneumococcal vaccine (one time only for most people). When should you call for help? Call 911 anytime you think you may need emergency care. For example, call if:    · You have severe trouble breathing.    Call your doctor now or seek immediate medical care if:    · You cough up dark brown or bloody mucus (sputum).     · You have new or worse trouble breathing.     · You are dizzy or lightheaded, or you feel like you may faint.    Watch closely for changes in your health, and be sure to contact your doctor if:    · You have a new or higher fever.     · You are coughing more deeply or more often.     · You are not getting better after 2 days (48 hours).     · You do not get better as expected. Where can you learn more? Go to http://avril-brody.info/. Enter [id number] in the search box to learn more about \"Pneumonia: Care Instructions. \"  Current as of: December 6, 2017  Content Version: 11.8  © 4877-9670 [employer]. Care instructions adapted under license by [employer] (which disclaims liability or warranty for this information).  If you have questions about a medical condition or this instruction, always ask your healthcare professional. [place] [address] any warranty or liability for your use of this information.

## 2018-12-03 NOTE — ED NOTES
Patient states she feels somewhat better after breathing treatment. She is awaiting discharge paperwork.

## 2020-10-05 ENCOUNTER — HOSPITAL ENCOUNTER (EMERGENCY)
Age: 65
Discharge: HOME | End: 2020-10-05
Attending: EMERGENCY MEDICINE
Payer: MEDICARE

## 2020-10-05 VITALS
RESPIRATION RATE: 30 BRPM | HEART RATE: 112 BPM | BODY MASS INDEX: 20.09 KG/M2 | OXYGEN SATURATION: 100 % | DIASTOLIC BLOOD PRESSURE: 99 MMHG | HEIGHT: 66 IN | WEIGHT: 125 LBS | SYSTOLIC BLOOD PRESSURE: 146 MMHG

## 2020-10-05 DIAGNOSIS — F41.1 ANXIETY STATE: Primary | ICD-10-CM

## 2020-10-05 DIAGNOSIS — H65.01 RIGHT ACUTE SEROUS OTITIS MEDIA, RECURRENCE NOT SPECIFIED: ICD-10-CM

## 2020-10-05 PROCEDURE — 99282 EMERGENCY DEPT VISIT SF MDM: CPT

## 2020-10-05 RX ORDER — AMOXICILLIN 250 MG/1
250 CAPSULE ORAL 3 TIMES DAILY
Qty: 30 CAP | Refills: 0 | Status: SHIPPED | OUTPATIENT
Start: 2020-10-05 | End: 2020-10-15

## 2020-10-05 RX ORDER — HYDROXYZINE PAMOATE 25 MG/1
25 CAPSULE ORAL
Qty: 15 CAP | Refills: 0 | Status: SHIPPED | OUTPATIENT
Start: 2020-10-05 | End: 2020-10-19

## 2020-10-05 RX ORDER — HYDROXYZINE PAMOATE 25 MG/1
25 CAPSULE ORAL
Status: DISCONTINUED | OUTPATIENT
Start: 2020-10-05 | End: 2020-10-05

## 2020-10-05 NOTE — ROUTINE PROCESS
Daniel Carvajal is a 60 y.o. female that was discharged in stable. Pt was accompanied by friend. Pt is not driving. The patients diagnosis, condition and treatment were explained to  patient and aftercare instructions were given. The patient verbalized understanding. Patient armband removed and shredded.

## 2020-10-05 NOTE — ED TRIAGE NOTES
Patient states hx of anxiety. C/o insomnia x 2 nights and advises that she hasn't eaten in two days. She c/o fatigue and dizziness. Patient hyperventilating at time of triage.

## 2020-10-05 NOTE — ED PROVIDER NOTES
HPI patient says she has had some dull aching in her right ear for several days and thinks she may have an earache. Additionally, she complains of an \"anxiety attack\". She says she has not been able to sleep well for several days and was up awake all last night. She denies any fever or chills. She denies any chest pain. She says she has intermittent dizziness and she thinks that may be attributed to her ear problems. Patient is a very vague historian and gives no further specifics.     Past Medical History:   Diagnosis Date    Anxiety 2010    GERD (gastroesophageal reflux disease)     Grief reaction 2010    Headache disorder 2010    Headache(784.0)     Hypercholesterolemia     Pure hypercholesterolemia 2010    Walking pneumonia        Past Surgical History:   Procedure Laterality Date    HX COLONOSCOPY           Family History:   Problem Relation Age of Onset    Hypertension Father     Elevated Lipids Father     Heart Disease Father        Social History     Socioeconomic History    Marital status:      Spouse name: Not on file    Number of children: Not on file    Years of education: Not on file    Highest education level: Not on file   Occupational History    Not on file   Social Needs    Financial resource strain: Not on file    Food insecurity     Worry: Not on file     Inability: Not on file    Transportation needs     Medical: Not on file     Non-medical: Not on file   Tobacco Use    Smoking status: Former Smoker     Packs/day: 0.25     Years: 30.00     Pack years: 7.50     Last attempt to quit: 2020     Years since quittin.0    Smokeless tobacco: Never Used   Substance and Sexual Activity    Alcohol use: No    Drug use: No    Sexual activity: Not Currently     Partners: Male   Lifestyle    Physical activity     Days per week: Not on file     Minutes per session: Not on file    Stress: Not on file   Relationships    Social connections Talks on phone: Not on file     Gets together: Not on file     Attends Muslim service: Not on file     Active member of club or organization: Not on file     Attends meetings of clubs or organizations: Not on file     Relationship status: Not on file    Intimate partner violence     Fear of current or ex partner: Not on file     Emotionally abused: Not on file     Physically abused: Not on file     Forced sexual activity: Not on file   Other Topics Concern    Not on file   Social History Narrative    Not on file         ALLERGIES: Aspirin; Darvocet a500 [propoxyphene n-acetaminophen]; Midrin [jqhezbd-xagfwtryy-jpulbfkzkhgq]; Neurontin [gabapentin]; Ultram [tramadol]; and Codeine    Review of Systems   Constitutional: Positive for fatigue. HENT: Positive for ear pain. Eyes: Negative. Respiratory: Negative. Cardiovascular: Negative. Gastrointestinal: Negative. Genitourinary: Negative. Musculoskeletal: Negative. Neurological: Positive for dizziness. Psychiatric/Behavioral: The patient is nervous/anxious. Vitals:    10/05/20 0901   BP: (!) 146/99   Pulse: (!) 112   Resp: 30   SpO2: 100%   Weight: 56.7 kg (125 lb)   Height: 5' 6\" (1.676 m)            Physical Exam  Vitals signs and nursing note reviewed. Constitutional:       Appearance: She is well-developed. Comments: Patient is alert and oriented but very anxious. She is hyperventilating but does calm with talking   HENT:      Head: Normocephalic and atraumatic. Left Ear: Tympanic membrane normal.      Ears:      Comments: Right tympanic membrane is dull with a posterior effusion  Eyes:      Conjunctiva/sclera: Conjunctivae normal.      Pupils: Pupils are equal, round, and reactive to light. Neck:      Musculoskeletal: Normal range of motion and neck supple. Cardiovascular:      Rate and Rhythm: Normal rate and regular rhythm.    Pulmonary:      Effort: Pulmonary effort is normal.      Breath sounds: Normal breath sounds. Abdominal:      Palpations: Abdomen is soft. Musculoskeletal: Normal range of motion. Skin:     General: Skin is warm and dry. Neurological:      Mental Status: She is alert and oriented to person, place, and time. MDM  Number of Diagnoses or Management Options  Diagnosis management comments: I discussed with the patient that we will treat her ear infection and give her something to help with her anxiety and insomnia. I also spent time talking with her answering her questions and providing reassurance. She said she felt better after discussion with the MD and she understands and agrees with the disposition and follow-up plan.   Tomas Plasencia MD         Procedures

## 2020-10-05 NOTE — DISCHARGE INSTRUCTIONS
Patient Education        Anxiety Disorder: Care Instructions  Your Care Instructions     Anxiety is a normal reaction to stress. Difficult situations can cause you to have symptoms such as sweaty palms and a nervous feeling. In an anxiety disorder, the symptoms are far more severe. Constant worry, muscle tension, trouble sleeping, nausea and diarrhea, and other symptoms can make normal daily activities difficult or impossible. These symptoms may occur for no reason, and they can affect your work, school, or social life. Medicines, counseling, and self-care can all help. Follow-up care is a key part of your treatment and safety. Be sure to make and go to all appointments, and call your doctor if you are having problems. It's also a good idea to know your test results and keep a list of the medicines you take. How can you care for yourself at home? · Take medicines exactly as directed. Call your doctor if you think you are having a problem with your medicine. · Go to your counseling sessions and follow-up appointments. · Recognize and accept your anxiety. Then, when you are in a situation that makes you anxious, say to yourself, \"This is not an emergency. I feel uncomfortable, but I am not in danger. I can keep going even if I feel anxious. \"  · Be kind to your body:  ? Relieve tension with exercise or a massage. ? Get enough rest.  ? Avoid alcohol, caffeine, nicotine, and illegal drugs. They can increase your anxiety level and cause sleep problems. ? Learn and do relaxation techniques. See below for more about these techniques. · Engage your mind. Get out and do something you enjoy. Go to a funny movie, or take a walk or hike. Plan your day. Having too much or too little to do can make you anxious. · Keep a record of your symptoms. Discuss your fears with a good friend or family member, or join a support group for people with similar problems. Talking to others sometimes relieves stress.   · Get involved in social groups, or volunteer to help others. Being alone sometimes makes things seem worse than they are. · Get at least 30 minutes of exercise on most days of the week to relieve stress. Walking is a good choice. You also may want to do other activities, such as running, swimming, cycling, or playing tennis or team sports. Relaxation techniques  Do relaxation exercises 10 to 20 minutes a day. You can play soothing, relaxing music while you do them, if you wish. · Tell others in your house that you are going to do your relaxation exercises. Ask them not to disturb you. · Find a comfortable place, away from all distractions and noise. · Lie down on your back, or sit with your back straight. · Focus on your breathing. Make it slow and steady. · Breathe in through your nose. Breathe out through either your nose or mouth. · Breathe deeply, filling up the area between your navel and your rib cage. Breathe so that your belly goes up and down. · Do not hold your breath. · Breathe like this for 5 to 10 minutes. Notice the feeling of calmness throughout your whole body. As you continue to breathe slowly and deeply, relax by doing the following for another 5 to 10 minutes:  · Tighten and relax each muscle group in your body. You can begin at your toes and work your way up to your head. · Imagine your muscle groups relaxing and becoming heavy. · Empty your mind of all thoughts. · Let yourself relax more and more deeply. · Become aware of the state of calmness that surrounds you. · When your relaxation time is over, you can bring yourself back to alertness by moving your fingers and toes and then your hands and feet and then stretching and moving your entire body. Sometimes people fall asleep during relaxation, but they usually wake up shortly afterward. · Always give yourself time to return to full alertness before you drive a car or do anything that might cause an accident if you are not fully alert.  Never play a relaxation tape while you drive a car. When should you call for help? Call 911 anytime you think you may need emergency care. For example, call if:    · You feel you cannot stop from hurting yourself or someone else. Keep the numbers for these national suicide hotlines: 1-886-089-TALK (5-550.339.7900) and 0-744-TPMRQJM (8-622.861.2567). If you or someone you know talks about suicide or feeling hopeless, get help right away. Watch closely for changes in your health, and be sure to contact your doctor if:    · You have anxiety or fear that affects your life.     · You have symptoms of anxiety that are new or different from those you had before. Where can you learn more? Go to http://www.álvarez.com/  Enter P754 in the search box to learn more about \"Anxiety Disorder: Care Instructions. \"  Current as of: January 31, 2020               Content Version: 12.6  © 2006-2020 [employer]. Care instructions adapted under license by [employer] (which disclaims liability or warranty for this information). If you have questions about a medical condition or this instruction, always ask your healthcare professional. [place] [address] any warranty or liability for your use of this information. Patient Education        Middle Ear Fluid: Care Instructions  Your Care Instructions     Fluid often builds up inside the ear during a cold or allergies. Usually the fluid drains away, but sometimes a small tube in the ear, called the eustachian tube, stays blocked for months. Symptoms of fluid buildup may include:  · Popping, ringing, or a feeling of fullness or pressure in the ear. · Trouble hearing. · Balance problems and dizziness. In most cases, you can treat yourself at home. Follow-up care is a key part of your treatment and safety. Be sure to make and go to all appointments, and call your doctor if you are having problems.  It's also a good idea to know your test results and keep a list of the medicines you take. How can you care for yourself at home? · In most cases, the fluid clears up within a few months without treatment. You may need more tests if the fluid does not clear up after 3 months. · If your doctor prescribed antibiotics, take them as directed. Do not stop taking them just because you feel better. You need to take the full course of antibiotics. When should you call for help? Call your doctor now or seek immediate medical care if:    · You have symptoms of infection, such as:  ? Increased pain, swelling, warmth, or redness. ? Pus draining from the area. ? A fever. Watch closely for changes in your health, and be sure to contact your doctor if:    · You notice changes in hearing.     · You do not get better as expected. Where can you learn more? Go to http://www.gray.com/  Enter I094 in the search box to learn more about \"Middle Ear Fluid: Care Instructions. \"  Current as of: April 15, 2020               Content Version: 12.6  © 4679-7883 [employer]. Care instructions adapted under license by [employer] (which disclaims liability or warranty for this information). If you have questions about a medical condition or this instruction, always ask your healthcare professional. [address] any warranty or liability for your use of this information.

## 2021-01-19 ENCOUNTER — HOSPITAL ENCOUNTER (EMERGENCY)
Age: 66
Discharge: HOME | End: 2021-01-19
Attending: EMERGENCY MEDICINE
Payer: MEDICARE

## 2021-01-19 VITALS
BODY MASS INDEX: 18.33 KG/M2 | TEMPERATURE: 97.8 F | HEIGHT: 65 IN | DIASTOLIC BLOOD PRESSURE: 91 MMHG | SYSTOLIC BLOOD PRESSURE: 141 MMHG | OXYGEN SATURATION: 96 % | RESPIRATION RATE: 20 BRPM | WEIGHT: 110 LBS | HEART RATE: 97 BPM

## 2021-01-19 DIAGNOSIS — H65.191 ACUTE EFFUSION OF RIGHT EAR: ICD-10-CM

## 2021-01-19 DIAGNOSIS — H69.81 DYSFUNCTION OF RIGHT EUSTACHIAN TUBE: Primary | ICD-10-CM

## 2021-01-19 PROCEDURE — 99282 EMERGENCY DEPT VISIT SF MDM: CPT

## 2021-01-19 RX ORDER — CETIRIZINE HCL 10 MG
10 TABLET ORAL DAILY
Qty: 30 TAB | Refills: 0 | Status: SHIPPED | OUTPATIENT
Start: 2021-01-19

## 2021-01-19 RX ORDER — METHYLPREDNISOLONE 4 MG/1
TABLET ORAL
Qty: 1 DOSE PACK | Refills: 0 | Status: SHIPPED | OUTPATIENT
Start: 2021-01-19 | End: 2021-08-18

## 2021-01-19 RX ORDER — FLUTICASONE PROPIONATE 50 MCG
2 SPRAY, SUSPENSION (ML) NASAL DAILY
Qty: 1 BOTTLE | Refills: 0 | Status: SHIPPED | OUTPATIENT
Start: 2021-01-19 | End: 2021-08-18

## 2021-01-19 NOTE — ED PROVIDER NOTES
EMERGENCY DEPARTMENT HISTORY AND PHYSICAL EXAM    Date: 1/19/2021  Patient Name: Jefe Espinoza    History of Presenting Illness     Chief Complaint:   Chief Complaint   Patient presents with    Ear Pain    Dizziness    Headache     History Provided By: Patient    Additional History (Context): Jefe Espinoza is a 60 y.o. female with hx of recurrent RT ear pains/fluid, HAs, anxiety, GERD, hyperlipidemia, ambulatory to ED c/o generalized headaches, dizziness and rt ear pain that began last evening. Notes she has recurrent RT ear pains, feels like she has fluid in ear. States headache worsens when bending over and c/o crackling sounds in rt ear. Did not try any remedies. She saw her PCP 5 days ago for regular checkup and had no problems then. PCP: Terence Carr MD    Current Outpatient Medications   Medication Sig Dispense Refill    methylPREDNISolone (Medrol, Rony,) 4 mg tablet Per dose pack instructions 1 Dose Pack 0    fluticasone propionate (FLONASE) 50 mcg/actuation nasal spray 2 Sprays by Both Nostrils route daily. 1 Bottle 0    cetirizine (ZyrTEC) 10 mg tablet Take 1 Tab by mouth daily. Take one tablets by mouth daily for 7 days and then as needed after that. Restart forseven days if sinus congestion recurs. 30 Tab 0    albuterol (PROVENTIL HFA, VENTOLIN HFA, PROAIR HFA) 90 mcg/actuation inhaler Take 2 Puffs by inhalation every four (4) hours as needed for Wheezing. 1 Inhaler 0    furosemide (LASIX) 20 mg tablet Take one tablet by mouth every day. Indications: hypertension 30 Tab 0    multivitamin (ONE A DAY) tablet Take 1 Tab by mouth daily.  omeprazole (PRILOSEC) 40 mg capsule Take 40 mg by mouth daily.  simvastatin (ZOCOR) 40 mg tablet Take 1 Tab by mouth nightly.  30 Tab 3       Past History     Past Medical History:  Past Medical History:   Diagnosis Date    Anxiety 8/26/2010    GERD (gastroesophageal reflux disease)     Grief reaction 8/26/2010    Headache disorder 2010    JMXPHNLW(237.0)     Hypercholesterolemia     Pure hypercholesterolemia 2010    Walking pneumonia        Past Surgical History:  Past Surgical History:   Procedure Laterality Date    HX COLONOSCOPY         Family History:  Family History   Problem Relation Age of Onset    Hypertension Father    [facility] Elevated Lipids Father     Heart Disease Father        Social History:  Social History     Tobacco Use    Smoking status: Former Smoker     Packs/day: 0.25     Years: 30.00     Pack years: 7.50     Quit date: 2020     Years since quittin.3    Smokeless tobacco: Never Used   Substance Use Topics    Alcohol use: No    Drug use: No       Allergies: Allergies   Allergen Reactions    Aspirin Nausea and Vomiting    Darvocet A500 [Propoxyphene N-Acetaminophen] Drowsiness    Midrin [Eavqotv-Tvkolbjvm-Msqzjtpivvsj] Drowsiness    Neurontin [Gabapentin] Drowsiness    Ultram [Tramadol] Nausea Only    Codeine Nausea Only         Review of Systems   Review of Systems   Constitutional: Negative for activity change, appetite change, chills and fever. HENT: Positive for ear pain and sinus pressure. Negative for congestion, dental problem, ear discharge, postnasal drip, rhinorrhea, sinus pain, sore throat, tinnitus and trouble swallowing. Eyes: Negative. Respiratory: Negative. Negative for cough and shortness of breath. Cardiovascular: Negative for chest pain and palpitations. Gastrointestinal: Negative for abdominal pain, diarrhea, nausea and vomiting. Genitourinary: Negative for dysuria. Musculoskeletal: Negative. Neurological: Positive for dizziness (positional). Negative for syncope and headaches. Hematological: Negative for adenopathy. Psychiatric/Behavioral: Negative. All other systems reviewed and are negative.     All Other Systems Negative  Physical Exam     Vitals:    21 1158   BP: (!) 141/91   Pulse: 97   Resp: 20   Temp: 97.8 °F (36.6 °C)   SpO2: 96% Weight: 49.9 kg (110 lb)   Height: 5' 5\" (1.651 m)     Physical Exam  Vitals signs and nursing note reviewed. Constitutional:       General: She is not in acute distress. Appearance: Normal appearance. She is well-developed. She is not toxic-appearing or diaphoretic. HENT:      Head: Normocephalic and atraumatic. Right Ear: Ear canal and external ear normal. No tenderness. A middle ear effusion (moderate, clear) is present. No mastoid tenderness. Tympanic membrane is not injected, erythematous, retracted or bulging. Left Ear: Tympanic membrane, ear canal and external ear normal. Tympanic membrane is not injected. Nose: Nose normal.      Mouth/Throat:      Mouth: Mucous membranes are moist.      Pharynx: Oropharynx is clear. Uvula midline. Comments: No PND  Eyes:      General: No scleral icterus. Extraocular Movements: Extraocular movements intact. Conjunctiva/sclera: Conjunctivae normal.      Pupils: Pupils are equal, round, and reactive to light. Neck:      Musculoskeletal: Normal range of motion and neck supple. Cardiovascular:      Rate and Rhythm: Normal rate and regular rhythm. Heart sounds: Normal heart sounds. Pulmonary:      Effort: Pulmonary effort is normal.      Breath sounds: Normal breath sounds. Abdominal:      Palpations: Abdomen is soft. Tenderness: There is no abdominal tenderness. Musculoskeletal: Normal range of motion. Lymphadenopathy:      Cervical: No cervical adenopathy. Skin:     General: Skin is warm and dry. Capillary Refill: Capillary refill takes less than 2 seconds. Neurological:      General: No focal deficit present. Mental Status: She is alert and oriented to person, place, and time. Cranial Nerves: No cranial nerve deficit. Sensory: No sensory deficit. Motor: No weakness.       Coordination: Coordination normal.      Gait: Gait normal.      Deep Tendon Reflexes: Reflexes normal.   Psychiatric: Mood and Affect: Mood normal.         Speech: Speech normal.         Behavior: Behavior normal.         Thought Content: Thought content normal.         Cognition and Memory: Cognition normal.            Diagnostic Study Results     Labs -   No results found for this or any previous visit (from the past 12 hour(s)). Radiologic Studies -   No orders to display     CT Results  (Last 48 hours)    None            Medical Decision Making   I am the first provider for this patient. I reviewed the vital signs, available nursing notes, past medical history, past surgical history, family history and social history. Vital Signs-Reviewed the patient's vital signs. Records Reviewed: Nursing Notes and Old Medical Records    Procedures:  Procedures    Provider Notes (Medical Decision Making):     Nontoxic patient presents with complaints of right ear pain, clicking, sensation of fluid in ear and with associated positional dizziness and mild headache of gradual onset has exam C/W eustachian tube dysfunction. Patient does have clear effusion, she has no erythema or other TM abnormality. Given recurrent nature of right ear problems, will treat with a short course of Medrol, Will Rx Zyrtec and Flonase. abx are not indicated at this time. Will provide contacts for ENT. No further work-up is needed. Discussed results, care in ED and further care, f/u and s/s warranting return to ED. Pt understood and agreed to plan. MED RECONCILIATION:  No current facility-administered medications for this encounter. Current Outpatient Medications   Medication Sig    methylPREDNISolone (Medrol, Rony,) 4 mg tablet Per dose pack instructions    fluticasone propionate (FLONASE) 50 mcg/actuation nasal spray 2 Sprays by Both Nostrils route daily.  cetirizine (ZyrTEC) 10 mg tablet Take 1 Tab by mouth daily. Take one tablets by mouth daily for 7 days and then as needed after that.   Restart forseven days if sinus congestion recurs.  albuterol (PROVENTIL HFA, VENTOLIN HFA, PROAIR HFA) 90 mcg/actuation inhaler Take 2 Puffs by inhalation every four (4) hours as needed for Wheezing.  furosemide (LASIX) 20 mg tablet Take one tablet by mouth every day. Indications: hypertension    multivitamin (ONE A DAY) tablet Take 1 Tab by mouth daily.  omeprazole (PRILOSEC) 40 mg capsule Take 40 mg by mouth daily.  simvastatin (ZOCOR) 40 mg tablet Take 1 Tab by mouth nightly. Disposition:  home    DISCHARGE NOTE:     Pt has been reexamined. Patient has no new complaints, changes, or physical findings. Care plan outlined and precautions discussed. All medications were reviewed with the patient; will d/c home. All of pt's questions and concerns were addressed. Patient was instructed and agrees to follow up with PCP, as well as to return to the ED upon further deterioration. Patient is ready to go home. Follow-up Information     Follow up With Specialties Details Why Contact Info    Laura Alvarez MD Pediatric Medicine In 3 days for recheck of current symptoms [facility] [address] [place] [address] [address]      [id number] [facility] EMERGENCY DEPT Emergency Medicine  As needed, If symptoms worsen [address]y Hayes MD Otolaryngology, Surgery Schedule an appointment as soon as possible for a visit  for further evaluation and treatment of recurrent ear problems by otolaryngologist (Bob Garcia, Throat specialist) [facility]  [place] [id number]  [address] [place] [address]  [phone]            Current Discharge Medication List      START taking these medications    Details   methylPREDNISolone (Medrol, Rony,) 4 mg tablet Per dose pack instructions  Qty: 1 Dose Pack, Refills: 0      fluticasone propionate (FLONASE) 50 mcg/actuation nasal spray 2 Sprays by Both Nostrils route daily.   Qty: 1 Bottle, Refills: 0      cetirizine (ZyrTEC) 10 mg tablet Take 1 Tab by mouth daily. Take one tablets by mouth daily for 7 days and then as needed after that. Restart forseven days if sinus congestion recurs. Qty: 30 Tab, Refills: 0                 Diagnosis     Clinical Impression:   1. Dysfunction of right eustachian tube    2. Acute effusion of right ear          Dictation disclaimer:  Please note that this dictation was completed with OANDA, the computer voice recognition software. Quite often unanticipated grammatical, syntax, homophones, and other interpretive errors are inadvertently transcribed by the computer software. Please disregard these errors. Please excuse any errors that have escaped final proofreading.

## 2021-01-19 NOTE — DISCHARGE INSTRUCTIONS
Finish oral steroids (like Medrol, Prednisone, Dexamethasone, Sterapred, Prednisolone, Orapred, etc) as directed. Note that steroids may cause temporary increase of blood glucose levels, if you are diabetic, monitor your glucose levels closely. Stop steroids and consult your primary care provider or endocrinologist if your levels increase significantly. Use Flonase nasal spray as directed daily. It is easier to use it before bed. Take over-the-counter or prescribed antihistamines such as Zyrtec, Claritin, Allegra, or others daily. Take Tylenol/Acetaminophen (every 4-6 hours) and/or Motrin/Ibuprofen/Advil (every 6-8 hours) or Naprosyn/Naproxyn/Aleve for fever or pain as needed. Follow up with your primary care provider or the provided referral for further evaluation and management  Return to emergency room at once for worsening or new symptoms.

## 2021-01-19 NOTE — ED TRIAGE NOTES
Patient c/o headache, dizziness and rt ear pain that began last evening. States headache worsens when bending over and c/o crackling sounds in rt ear.

## 2021-01-19 NOTE — ROUTINE PROCESS
Haylee Mitchell is a 60 y.o. female that was discharged in stable. Pt was accompanied by self. Pt is driving. The patients diagnosis, condition and treatment were explained to  patient and aftercare instructions were given. The patient verbalized understanding. Patient armband removed and shredded.

## 2021-01-27 ENCOUNTER — APPOINTMENT (OUTPATIENT)
Dept: GENERAL RADIOLOGY | Age: 66
End: 2021-01-27
Attending: EMERGENCY MEDICINE
Payer: MEDICARE

## 2021-01-27 ENCOUNTER — HOSPITAL ENCOUNTER (EMERGENCY)
Age: 66
Discharge: HOME | End: 2021-01-27
Attending: EMERGENCY MEDICINE
Payer: MEDICARE

## 2021-01-27 VITALS
DIASTOLIC BLOOD PRESSURE: 84 MMHG | BODY MASS INDEX: 19.99 KG/M2 | HEIGHT: 65 IN | RESPIRATION RATE: 16 BRPM | SYSTOLIC BLOOD PRESSURE: 158 MMHG | WEIGHT: 120 LBS | OXYGEN SATURATION: 98 % | TEMPERATURE: 97.9 F | HEART RATE: 82 BPM

## 2021-01-27 DIAGNOSIS — M25.571 ACUTE RIGHT ANKLE PAIN: Primary | ICD-10-CM

## 2021-01-27 PROCEDURE — 74011250637 HC RX REV CODE- 250/637: Performed by: EMERGENCY MEDICINE

## 2021-01-27 PROCEDURE — 99283 EMERGENCY DEPT VISIT LOW MDM: CPT

## 2021-01-27 PROCEDURE — 73610 X-RAY EXAM OF ANKLE: CPT

## 2021-01-27 RX ORDER — NAPROXEN 250 MG/1
500 TABLET ORAL
Status: COMPLETED | OUTPATIENT
Start: 2021-01-27 | End: 2021-01-27

## 2021-01-27 RX ADMIN — NAPROXEN 500 MG: 250 TABLET ORAL at 12:38

## 2021-01-27 NOTE — ED PROVIDER NOTES
EMERGENCY DEPARTMENT HISTORY AND PHYSICAL EXAM    9:40 AM      Date: (Not on file)  Patient Name: Shima Stein    History of Presenting Illness     No chief complaint on file. History Provided By: Patient    Additional History (Context): Shima Stein is a 60 y.o. female with Past medical history of anxiety, headaches, GERD who presents with complaint of right ankle pain for 2 days. Denies any trauma no history of gout. The pain is worse when she walks on it. Denies any swelling, calf pain, leg swelling, fever, knee pain, chest pain, shortness of breath, and no other complaints. PCP: Drew Patterson MD        Past History     Past Medical History:  Past Medical History:   Diagnosis Date    Anxiety 2010    GERD (gastroesophageal reflux disease)     Grief reaction 2010    Headache disorder 2010    Headache(784.0)     Hypercholesterolemia     Pure hypercholesterolemia 2010    Walking pneumonia        Past Surgical History:  Past Surgical History:   Procedure Laterality Date    HX COLONOSCOPY         Family History:  Family History   Problem Relation Age of Onset    Hypertension Father     Elevated Lipids Father     Heart Disease Father        Social History:  Social History     Tobacco Use    Smoking status: Former Smoker     Packs/day: 0.25     Years: 30.00     Pack years: 7.50     Quit date: 2020     Years since quittin.3    Smokeless tobacco: Never Used   Substance Use Topics    Alcohol use: No    Drug use: No       Allergies: Allergies   Allergen Reactions    Aspirin Nausea and Vomiting    Darvocet A500 [Propoxyphene N-Acetaminophen] Drowsiness    Midrin [Oaljjor-Hrjhushzc-Oqtdwpcptlxg] Drowsiness    Neurontin [Gabapentin] Drowsiness    Ultram [Tramadol] Nausea Only    Codeine Nausea Only         Review of Systems       Review of Systems   Constitutional: Negative for chills and fever.    HENT: Negative for rhinorrhea, sore throat and trouble swallowing. Respiratory: Negative for cough and shortness of breath. Cardiovascular: Negative for chest pain and leg swelling. Gastrointestinal: Negative for nausea and vomiting. Endocrine: Negative for polyuria. Genitourinary: Negative. Musculoskeletal: Positive for arthralgias. Negative for back pain, myalgias and neck stiffness. Right ankle pain   Skin: Negative for pallor and rash. Neurological: Negative for dizziness, weakness and numbness. Psychiatric/Behavioral: Negative for confusion. All other systems reviewed and are negative. Physical Exam     Visit Vitals  BP (!) 158/84 (BP 1 Location: Left arm, BP Patient Position: At rest)   Pulse 82   Temp 97.9 °F (36.6 °C)   Resp 16   Ht 5' 5\" (1.651 m)   Wt 54.4 kg (120 lb)   SpO2 98%   BMI 19.97 kg/m²         Physical Exam  Vitals signs and nursing note reviewed. Constitutional:       General: She is not in acute distress. Appearance: She is well-developed. She is not diaphoretic. HENT:      Head: Normocephalic and atraumatic. Eyes:      General: No scleral icterus. Conjunctiva/sclera: Conjunctivae normal.      Pupils: Pupils are equal, round, and reactive to light. Neck:      Musculoskeletal: Normal range of motion and neck supple. Cardiovascular:      Rate and Rhythm: Normal rate. Pulses: Normal pulses. Comments: Capillary refill < 3 seconds  Pulmonary:      Effort: Pulmonary effort is normal. No respiratory distress. Musculoskeletal: Normal range of motion. General: Tenderness (Right ankle tenderness to palpate the lateral malleoli region.) present. No swelling. Right lower leg: No edema. Left lower leg: No edema.       Comments: No lower extremity edema or swelling and no calf tenderness    No redness of the painful lower extremity    Has normal dorsalis pedal pulses normal capillary refill    Patient is ambulating with a slight limp    Has full range of motion bilateral lower extremities with normal strength   Skin:     General: Skin is warm and dry. Neurological:      Mental Status: She is alert and oriented to person, place, and time. Cranial Nerves: No cranial nerve deficit. Sensory: No sensory deficit. Motor: No weakness. Coordination: Coordination normal.      Deep Tendon Reflexes: Reflexes normal.           Diagnostic Study Results     Labs -  No results found for this or any previous visit (from the past 12 hour(s)). Radiologic Studies -   XR ANKLE RT MIN 3 V   Final Result   No acute fracture. Medical Decision Making   I am the first provider for this patient. I reviewed the vital signs, available nursing notes, past medical history, past surgical history, family history and social history. Vital Signs-Reviewed the patient's vital signs. Pulse Oximetry Analysis -  98% on room air (Interpretation) normal        Records Reviewed: Nursing Notes and Old Medical Records (Time of Review: 9:40 AM)    Provider Notes (Medical Decision Making): DDx: Sprain, strain, arthritis, fracture    We will get x-ray    MDM    Medications - No data to display        ED Course: Progress Notes, Reevaluation, and Consults:  Nothing acute on x-ray. I offered the patient a cast shoe, but she refused. We will give her referral for podiatry    I have reassessed the patient. I have discussed the workup, results and plan with the patient and patient is in agreement. Patient was discharge in stable condition. Patient was given outpatient follow up. Patient is to return to emergency department if any new or worsening condition. Diagnosis     Clinical Impression:   1.  Acute right ankle pain        Disposition: Discharged    Follow-up Information     Follow up With Specialties Details Why Contact Ronnie Rice DPM Podiatry Schedule an appointment as soon as possible for a visit in 2 days  [address] [facility] [address]      Queenie Zamudio MD Pediatric Medicine In 1 week  [address] [facility] EMERGENCY DEPT Emergency Medicine  As needed, If symptoms worsen [address] [facility]  [phone]           Discharge Medication List as of 1/27/2021 12:20 PM      CONTINUE these medications which have NOT CHANGED    Details   methylPREDNISolone (Medrol, Rony,) 4 mg tablet Per dose pack instructions, Print, Disp-1 Dose Pack, R-0      fluticasone propionate (FLONASE) 50 mcg/actuation nasal spray 2 Sprays by Both Nostrils route daily. , Print, Disp-1 Bottle, R-0      cetirizine (ZyrTEC) 10 mg tablet Take 1 Tab by mouth daily. Take one tablets by mouth daily for 7 days and then as needed after that. Restart forseven days if sinus congestion recurs. , Print, Disp-30 Tab, R-0      albuterol (PROVENTIL HFA, VENTOLIN HFA, PROAIR HFA) 90 mcg/actuation inhaler Take 2 Puffs by inhalation every four (4) hours as needed for Wheezing., Print, Disp-1 Inhaler, R-0      furosemide (LASIX) 20 mg tablet Take one tablet by mouth every day. Indications: hypertension, Print, Disp-30 Tab, R-0      multivitamin (ONE A DAY) tablet Take 1 Tab by mouth daily. , Historical Med      omeprazole (PRILOSEC) 40 mg capsule Take 40 mg by mouth daily. , Historical Med      simvastatin (ZOCOR) 40 mg tablet Take 1 Tab by mouth nightly., Normal, Disp-30 Tab, R-3               Jacob Bernal DO    Dragon medical dictation software was used for portions of this report. Unintended transcription errors may occur. My signature above authenticates this document and my orders, the final    diagnosis (es), discharge prescription (s), and instructions in the Epic    record.

## 2021-08-18 ENCOUNTER — APPOINTMENT (OUTPATIENT)
Dept: CT IMAGING | Age: 66
End: 2021-08-18
Attending: EMERGENCY MEDICINE
Payer: MEDICARE

## 2021-08-18 ENCOUNTER — HOSPITAL ENCOUNTER (EMERGENCY)
Age: 66
Discharge: HOME | End: 2021-08-18
Attending: EMERGENCY MEDICINE
Payer: MEDICARE

## 2021-08-18 VITALS
OXYGEN SATURATION: 100 % | HEART RATE: 85 BPM | TEMPERATURE: 98.5 F | DIASTOLIC BLOOD PRESSURE: 79 MMHG | SYSTOLIC BLOOD PRESSURE: 161 MMHG | RESPIRATION RATE: 19 BRPM

## 2021-08-18 DIAGNOSIS — E87.6 HYPOKALEMIA: ICD-10-CM

## 2021-08-18 DIAGNOSIS — G47.01 INSOMNIA DUE TO MEDICAL CONDITION: ICD-10-CM

## 2021-08-18 DIAGNOSIS — F30.8 HYPOMANIA (HCC): ICD-10-CM

## 2021-08-18 DIAGNOSIS — H93.8X1 EAR FULLNESS, RIGHT: Primary | ICD-10-CM

## 2021-08-18 LAB
ALBUMIN SERPL-MCNC: 4.8 G/DL (ref 3.4–5)
ALBUMIN/GLOB SERPL: 1.3 {RATIO} (ref 0.8–1.7)
ALP SERPL-CCNC: 103 U/L (ref 45–117)
ALT SERPL-CCNC: 28 U/L (ref 13–56)
ANION GAP SERPL CALC-SCNC: 11 MMOL/L (ref 3–18)
AST SERPL-CCNC: 30 U/L (ref 10–38)
ATRIAL RATE: 97 BPM
BASOPHILS # BLD: 0.1 K/UL (ref 0–0.1)
BASOPHILS NFR BLD: 1 % (ref 0–2)
BILIRUB SERPL-MCNC: 1 MG/DL (ref 0.2–1)
BUN SERPL-MCNC: 47 MG/DL (ref 7–18)
BUN/CREAT SERPL: 39 (ref 12–20)
CALCIUM SERPL-MCNC: 9.7 MG/DL (ref 8.5–10.1)
CALCULATED P AXIS, ECG09: 51 DEGREES
CALCULATED R AXIS, ECG10: 20 DEGREES
CALCULATED T AXIS, ECG11: 70 DEGREES
CHLORIDE SERPL-SCNC: 99 MMOL/L (ref 100–111)
CO2 SERPL-SCNC: 24 MMOL/L (ref 21–32)
CREAT SERPL-MCNC: 1.2 MG/DL (ref 0.6–1.3)
DIAGNOSIS, 93000: NORMAL
DIFFERENTIAL METHOD BLD: ABNORMAL
EOSINOPHIL # BLD: 0 K/UL (ref 0–0.4)
EOSINOPHIL NFR BLD: 0 % (ref 0–5)
ERYTHROCYTE [DISTWIDTH] IN BLOOD BY AUTOMATED COUNT: 14.2 % (ref 11.6–14.5)
GLOBULIN SER CALC-MCNC: 3.7 G/DL (ref 2–4)
GLUCOSE SERPL-MCNC: 116 MG/DL (ref 74–99)
HCT VFR BLD AUTO: 38.6 % (ref 35–45)
HGB BLD-MCNC: 13.5 G/DL (ref 12–16)
LYMPHOCYTES # BLD: 1.9 K/UL (ref 0.9–3.6)
LYMPHOCYTES NFR BLD: 20 % (ref 21–52)
MCH RBC QN AUTO: 33 PG (ref 24–34)
MCHC RBC AUTO-ENTMCNC: 35 G/DL (ref 31–37)
MCV RBC AUTO: 94.4 FL (ref 74–97)
MONOCYTES # BLD: 1.1 K/UL (ref 0.05–1.2)
MONOCYTES NFR BLD: 11 % (ref 3–10)
NEUTS SEG # BLD: 6.4 K/UL (ref 1.8–8)
NEUTS SEG NFR BLD: 67 % (ref 40–73)
P-R INTERVAL, ECG05: 178 MS
PLATELET # BLD AUTO: 380 K/UL (ref 135–420)
PMV BLD AUTO: 9.4 FL (ref 9.2–11.8)
POTASSIUM SERPL-SCNC: 2.9 MMOL/L (ref 3.5–5.5)
PROT SERPL-MCNC: 8.5 G/DL (ref 6.4–8.2)
Q-T INTERVAL, ECG07: 430 MS
QRS DURATION, ECG06: 96 MS
QTC CALCULATION (BEZET), ECG08: 546 MS
RBC # BLD AUTO: 4.09 M/UL (ref 4.2–5.3)
SODIUM SERPL-SCNC: 134 MMOL/L (ref 136–145)
TSH SERPL DL<=0.05 MIU/L-ACNC: 1.2 UIU/ML (ref 0.36–3.74)
VENTRICULAR RATE, ECG03: 97 BPM
WBC # BLD AUTO: 9.5 K/UL (ref 4.6–13.2)

## 2021-08-18 PROCEDURE — 93005 ELECTROCARDIOGRAM TRACING: CPT

## 2021-08-18 PROCEDURE — 99284 EMERGENCY DEPT VISIT MOD MDM: CPT

## 2021-08-18 PROCEDURE — 85025 COMPLETE CBC W/AUTO DIFF WBC: CPT

## 2021-08-18 PROCEDURE — 80053 COMPREHEN METABOLIC PANEL: CPT

## 2021-08-18 PROCEDURE — 70450 CT HEAD/BRAIN W/O DYE: CPT

## 2021-08-18 PROCEDURE — 74011250637 HC RX REV CODE- 250/637: Performed by: EMERGENCY MEDICINE

## 2021-08-18 PROCEDURE — 84443 ASSAY THYROID STIM HORMONE: CPT

## 2021-08-18 RX ORDER — HYDROCHLOROTHIAZIDE 25 MG/1
25 TABLET ORAL DAILY
COMMUNITY

## 2021-08-18 RX ORDER — POTASSIUM CHLORIDE 20 MEQ/1
40 TABLET, EXTENDED RELEASE ORAL
Status: COMPLETED | OUTPATIENT
Start: 2021-08-18 | End: 2021-08-18

## 2021-08-18 RX ORDER — ESCITALOPRAM OXALATE 20 MG/1
20 TABLET ORAL DAILY
COMMUNITY

## 2021-08-18 RX ADMIN — POTASSIUM CHLORIDE 40 MEQ: 1500 TABLET, EXTENDED RELEASE ORAL at 11:25

## 2021-08-18 NOTE — ED NOTES
I have reviewed discharge instructions with the patient. The patient verbalized understanding. Patient armband removed and shredded. Pt ambulated to waiting area where she states she is calling a ride.

## 2021-08-18 NOTE — ED TRIAGE NOTES
Patient states she hasn't slept in 4 nights. She lives in a hotel and has noisy neighbors. She states her heart is beating fast, she has SOB and is sweaty. Patient seems very anxious.

## 2021-08-18 NOTE — ED PROVIDER NOTES
60-year-old female history of hyperlipidemia and headaches presents for evaluation of multiple complaints. Patient states she has been unable to sleep for the last 3 nights. She does admit to feeling \"anxious over things. \"  She demonstrates pressured speech and flight of ideas and admits that she is having trouble concentrating and focusing. She complains of fullness in the right ear. She believes that her eustachian tube is unable to drain properly. Notes intermittent tinnitus from the right. No ear pain per se. Denies nasal congestion. No fever, chills, nausea, vomiting. She has a slight cough. She feels as if her heart is racing at times. States she is also having coordination issues, having trouble picking things up and holding things properly. At times she gesticulates and waves her arms while supine on the gurney. She then apologizes and says \"I don't know with wrong with me. \"  Symptoms present and progressive over the last 2 weeks. Felt lightheaded and \"dizzy\" yesterday and thought that she was going to \"pass out. \"  No associated chest pain.            Past Medical History:   Diagnosis Date    Anxiety 8/26/2010    GERD (gastroesophageal reflux disease)     Grief reaction 8/26/2010    Headache disorder 8/26/2010    Headache(784.0)     Hypercholesterolemia     Pure hypercholesterolemia 8/26/2010    Walking pneumonia        Past Surgical History:   Procedure Laterality Date    HX COLONOSCOPY           Family History:   Problem Relation Age of Onset    Hypertension Father     Elevated Lipids Father     Heart Disease Father        Social History     Socioeconomic History    Marital status:      Spouse name: Not on file    Number of children: Not on file    Years of education: Not on file    Highest education level: Not on file   Occupational History    Not on file   Tobacco Use    Smoking status: Former Smoker     Packs/day: 0.25     Years: 30.00     Pack years: 7.50     Quit date: 2020     Years since quittin.9    Smokeless tobacco: Never Used   Substance and Sexual Activity    Alcohol use: No    Drug use: No    Sexual activity: Not Currently     Partners: Male   Other Topics Concern    Not on file   Social History Narrative    Not on file     Social Determinants of Health     Financial Resource Strain:     Difficulty of Paying Living Expenses:    Food Insecurity:     Worried About Running Out of Food in the Last Year:     [address] in the Last Year:    Transportation Needs:     Lack of Transportation (Medical):  Lack of Transportation (Non-Medical):    Physical Activity:     Days of Exercise per Week:     Minutes of Exercise per Session:    Stress:     Feeling of Stress :    Social Connections:     Frequency of Communication with Friends and Family:     Frequency of Social Gatherings with Friends and Family:     Attends Jainism Services:     Active Member of Clubs or Organizations:     Attends Club or Organization Meetings:     Marital Status:    Intimate Partner Violence:     Fear of Current or Ex-Partner:     Emotionally Abused:     Physically Abused:     Sexually Abused: ALLERGIES: Aspirin, Darvocet a500 [propoxyphene n-acetaminophen], Midrin [qqulgrw-njihbxhtx-eqzzqzazviex], Neurontin [gabapentin], Ultram [tramadol], and Codeine    Review of Systems   Constitutional: Negative for fever. HENT: Positive for sinus pressure. Negative for ear discharge, facial swelling, nosebleeds, postnasal drip, rhinorrhea and sore throat. Respiratory: Positive for shortness of breath. Negative for chest tightness. Cardiovascular: Positive for palpitations. Negative for chest pain. Gastrointestinal: Negative for abdominal pain and vomiting. Musculoskeletal: Negative for neck stiffness. Neurological: Positive for light-headedness.        Vitals:    21 1115 21 1116 21 1118 21 1120   BP:   (!) 161/79    Pulse: 86  86 85   Resp: 19  20 19   Temp:       SpO2:  100%  100%            Physical Exam  Vitals and nursing note reviewed. Constitutional:       General: She is not in acute distress. Appearance: She is well-developed. HENT:      Head: Normocephalic and atraumatic. Eyes:      General: No scleral icterus. Cardiovascular:      Rate and Rhythm: Normal rate. Pulmonary:      Effort: Pulmonary effort is normal.   Skin:     General: Skin is warm and dry. Neurological:      Mental Status: She is alert and oriented to person, place, and time. Psychiatric:      Comments: Patient demonstrates mildly pressured speech. Requires redirection to focus on complaints. No stigmata of active auditory or visual hallucinations. Recent Results (from the past 12 hour(s))   EKG, 12 LEAD, INITIAL    Collection Time: 08/18/21  9:05 AM   Result Value Ref Range    Ventricular Rate 97 BPM    Atrial Rate 97 BPM    P-R Interval 178 ms    QRS Duration 96 ms    Q-T Interval 430 ms    QTC Calculation (Bezet) 546 ms    Calculated P Axis 51 degrees    Calculated R Axis 20 degrees    Calculated T Axis 70 degrees    Diagnosis       Normal sinus rhythm  Voltage criteria for left ventricular hypertrophy  Nonspecific ST abnormality  Prolonged QT  Abnormal ECG  When compared with ECG of 24-JUL-2017 16:41,  QT has lengthened     CBC WITH AUTOMATED DIFF    Collection Time: 08/18/21  9:10 AM   Result Value Ref Range    WBC 9.5 4.6 - 13.2 K/uL    RBC 4.09 (L) 4.20 - 5.30 M/uL    HGB 13.5 12.0 - 16.0 g/dL    HCT 38.6 35.0 - 45.0 %    MCV 94.4 74.0 - 97.0 FL    MCH 33.0 24.0 - 34.0 PG    MCHC 35.0 31.0 - 37.0 g/dL    RDW 14.2 11.6 - 14.5 %    PLATELET 971 773 - 398 K/uL    MPV 9.4 9.2 - 11.8 FL    NEUTROPHILS 67 40 - 73 %    LYMPHOCYTES 20 (L) 21 - 52 %    MONOCYTES 11 (H) 3 - 10 %    EOSINOPHILS 0 0 - 5 %    BASOPHILS 1 0 - 2 %    ABS. NEUTROPHILS 6.4 1.8 - 8.0 K/UL    ABS. LYMPHOCYTES 1.9 0.9 - 3.6 K/UL    ABS.  MONOCYTES 1.1 0.05 - 1.2 K/UL ABS. EOSINOPHILS 0.0 0.0 - 0.4 K/UL    ABS. BASOPHILS 0.1 0.0 - 0.1 K/UL    DF AUTOMATED     METABOLIC PANEL, COMPREHENSIVE    Collection Time: 08/18/21  9:10 AM   Result Value Ref Range    Sodium 134 (L) 136 - 145 mmol/L    Potassium 2.9 (LL) 3.5 - 5.5 mmol/L    Chloride 99 (L) 100 - 111 mmol/L    CO2 24 21 - 32 mmol/L    Anion gap 11 3.0 - 18 mmol/L    Glucose 116 (H) 74 - 99 mg/dL    BUN 47 (H) 7.0 - 18 MG/DL    Creatinine 1.20 0.6 - 1.3 MG/DL    BUN/Creatinine ratio 39 (H) 12 - 20      GFR est AA 55 (L) >60 ml/min/1.73m2    GFR est non-AA 45 (L) >60 ml/min/1.73m2    Calcium 9.7 8.5 - 10.1 MG/DL    Bilirubin, total 1.0 0.2 - 1.0 MG/DL    ALT (SGPT) 28 13 - 56 U/L    AST (SGOT) 30 10 - 38 U/L    Alk. phosphatase 103 45 - 117 U/L    Protein, total 8.5 (H) 6.4 - 8.2 g/dL    Albumin 4.8 3.4 - 5.0 g/dL    Globulin 3.7 2.0 - 4.0 g/dL    A-G Ratio 1.3 0.8 - 1.7     TSH 3RD GENERATION    Collection Time: 08/18/21  9:10 AM   Result Value Ref Range    TSH 1.20 0.36 - 3.74 uIU/mL     CT HEAD WO CONT   Final Result      No acute intracranial process or intracranial mass. All CT scans at this facility are performed using dose optimization technique as   appropriate to the performed exam, to include automated exposure control,   adjustment of the mA and/or kV according to patient's size (Including   appropriate matching for site-specific examinations), or use of iterative   reconstruction technique. EKG demonstrates normal sinus rhythm, rate 97. No ST segment elevation. Prolonged QT. Impression: Nonischemic EKG      MDM  Number of Diagnoses or Management Options  Ear fullness, right  Hypokalemia  Hypomania (HCC)  Insomnia due to medical condition  Diagnosis management comments: Impression: New onset hypomanic state characterized by difficulty concentrating, pressured speech, difficulty sleeping. Denies prior history of same. Primary complaint to me is that of fullness in the right ear.   TM appears translucent midposition and auditory canal is normal.  Metabolic profile, EKG and head CT scan reviewed. CT negative. Thyroid study within normal limits. Incidental note of hypokalemia. Repleted with 40 mEq of potassium orally. Patient will require psychiatric follow-up. Also given referral for ENT. Can recheck with primary care physician for repeat potassium level next week. Advised to eat high potassium containing foods. Procedures    Diagnosis:   1. Ear fullness, right    2. Insomnia due to medical condition    3. Hypomania ([address]    4. Hypokalemia          Disposition: home    Follow-up Information     Follow up With Specialties Details Why Contact Info    Jeri Baig MD Psychiatry Schedule an appointment as soon as possible for a visit  ASA, for recheck of ongoing symptoms [address] [facility] [address] [facility],[address]      Иван Plasencia MD Otolaryngology, Surgery Schedule an appointment as soon as possible for a visit  for recheck of ear fullness in 1-2 weeks if it persists [address] [facility]  [phone]            Patient's Medications   Start Taking    No medications on file   Continue Taking    CETIRIZINE (ZYRTEC) 10 MG TABLET    Take 1 Tab by mouth daily. Take one tablets by mouth daily for 7 days and then as needed after that. Restart forseven days if sinus congestion recurs. ESCITALOPRAM OXALATE (LEXAPRO) 20 MG TABLET    Take 20 mg by mouth daily. HYDROCHLOROTHIAZIDE (HYDRODIURIL) 25 MG TABLET    Take 25 mg by mouth daily. SIMVASTATIN (ZOCOR) 40 MG TABLET    Take 1 Tab by mouth nightly. These Medications have changed    No medications on file   Stop Taking    ALBUTEROL (PROVENTIL HFA, VENTOLIN HFA, PROAIR HFA) 90 MCG/ACTUATION INHALER    Take 2 Puffs by inhalation every four (4) hours as needed for Wheezing. FLUTICASONE PROPIONATE (FLONASE) 50 MCG/ACTUATION NASAL SPRAY    2 Sprays by Both Nostrils route daily. FUROSEMIDE (LASIX) 20 MG TABLET    Take one tablet by mouth every day. Indications: hypertension    METHYLPREDNISOLONE (MEDROL, MARYURI,) 4 MG TABLET    Per dose pack instructions    MULTIVITAMIN (ONE A DAY) TABLET    Take 1 Tab by mouth daily. OMEPRAZOLE (PRILOSEC) 40 MG CAPSULE    Take 40 mg by mouth daily.

## 2021-08-18 NOTE — DISCHARGE INSTRUCTIONS
It is safe to try over-the-counter sleep aids contain diphenhydramine (Benadryl). I also supplement this with low-dose over-the-counter melatonin sleep aid. Strongly recommend psychiatric follow-up for additional evaluation. Referral also provided for ENT to check an ongoing symptoms of fullness in the ear. No evidence of active infection on current exam.  Potassium was low today. Supplement with potassium containing foods such as potatoes, citrus, bananas. Follow-up with your PCP for recheck of medical conditions and repeat potassium level next week. You were given 40 milliequivalents of potassium today.

## 2022-01-06 ENCOUNTER — APPOINTMENT (OUTPATIENT)
Dept: GENERAL RADIOLOGY | Age: 67
End: 2022-01-06
Attending: STUDENT IN AN ORGANIZED HEALTH CARE EDUCATION/TRAINING PROGRAM
Payer: MEDICARE

## 2022-01-06 ENCOUNTER — HOSPITAL ENCOUNTER (EMERGENCY)
Age: 67
Discharge: HOME | End: 2022-01-06
Attending: STUDENT IN AN ORGANIZED HEALTH CARE EDUCATION/TRAINING PROGRAM
Payer: MEDICARE

## 2022-01-06 VITALS
RESPIRATION RATE: 25 BRPM | BODY MASS INDEX: 19.99 KG/M2 | HEIGHT: 65 IN | TEMPERATURE: 101.6 F | OXYGEN SATURATION: 100 % | HEART RATE: 97 BPM | WEIGHT: 120 LBS | DIASTOLIC BLOOD PRESSURE: 58 MMHG | SYSTOLIC BLOOD PRESSURE: 163 MMHG

## 2022-01-06 DIAGNOSIS — J15.9 BACTERIAL PNEUMONIA: Primary | ICD-10-CM

## 2022-01-06 DIAGNOSIS — R78.81 BACTEREMIA: ICD-10-CM

## 2022-01-06 LAB
ANION GAP SERPL CALC-SCNC: 9 MMOL/L (ref 3–18)
ATRIAL RATE: 300 BPM
BASOPHILS # BLD: 0 K/UL (ref 0–0.1)
BASOPHILS NFR BLD: 0 % (ref 0–2)
BUN SERPL-MCNC: 20 MG/DL (ref 7–18)
BUN/CREAT SERPL: 21 (ref 12–20)
CALCIUM SERPL-MCNC: 8.8 MG/DL (ref 8.5–10.1)
CALCULATED P AXIS, ECG09: 58 DEGREES
CALCULATED R AXIS, ECG10: 52 DEGREES
CALCULATED T AXIS, ECG11: 49 DEGREES
CHLORIDE SERPL-SCNC: 100 MMOL/L (ref 100–111)
CO2 SERPL-SCNC: 26 MMOL/L (ref 21–32)
CREAT SERPL-MCNC: 0.97 MG/DL (ref 0.6–1.3)
DIAGNOSIS, 93000: NORMAL
DIFFERENTIAL METHOD BLD: ABNORMAL
EOSINOPHIL # BLD: 0 K/UL (ref 0–0.4)
EOSINOPHIL NFR BLD: 0 % (ref 0–5)
ERYTHROCYTE [DISTWIDTH] IN BLOOD BY AUTOMATED COUNT: 13.1 % (ref 11.6–14.5)
GLUCOSE SERPL-MCNC: 96 MG/DL (ref 74–99)
HCT VFR BLD AUTO: 36.6 % (ref 35–45)
HGB BLD-MCNC: 12.6 G/DL (ref 12–16)
IMM GRANULOCYTES # BLD AUTO: 0.1 K/UL (ref 0–0.04)
IMM GRANULOCYTES NFR BLD AUTO: 1 % (ref 0–0.5)
LACTATE BLD-SCNC: 1.21 MMOL/L (ref 0.4–2)
LYMPHOCYTES # BLD: 0.9 K/UL (ref 0.9–3.6)
LYMPHOCYTES NFR BLD: 10 % (ref 21–52)
MCH RBC QN AUTO: 33.8 PG (ref 24–34)
MCHC RBC AUTO-ENTMCNC: 34.4 G/DL (ref 31–37)
MCV RBC AUTO: 98.1 FL (ref 78–100)
MONOCYTES # BLD: 0.7 K/UL (ref 0.05–1.2)
MONOCYTES NFR BLD: 7 % (ref 3–10)
NEUTS SEG # BLD: 7.9 K/UL (ref 1.8–8)
NEUTS SEG NFR BLD: 82 % (ref 40–73)
NRBC # BLD: 0 K/UL (ref 0–0.01)
NRBC BLD-RTO: 0 PER 100 WBC
PLATELET # BLD AUTO: 277 K/UL (ref 135–420)
PMV BLD AUTO: 9.8 FL (ref 9.2–11.8)
POTASSIUM SERPL-SCNC: 3.3 MMOL/L (ref 3.5–5.5)
Q-T INTERVAL, ECG07: 374 MS
QRS DURATION, ECG06: 86 MS
QTC CALCULATION (BEZET), ECG08: 445 MS
RBC # BLD AUTO: 3.73 M/UL (ref 4.2–5.3)
SODIUM SERPL-SCNC: 135 MMOL/L (ref 136–145)
VENTRICULAR RATE, ECG03: 85 BPM
WBC # BLD AUTO: 9.6 K/UL (ref 4.6–13.2)

## 2022-01-06 PROCEDURE — 74011250636 HC RX REV CODE- 250/636: Performed by: STUDENT IN AN ORGANIZED HEALTH CARE EDUCATION/TRAINING PROGRAM

## 2022-01-06 PROCEDURE — U0003 INFECTIOUS AGENT DETECTION BY NUCLEIC ACID (DNA OR RNA); SEVERE ACUTE RESPIRATORY SYNDROME CORONAVIRUS 2 (SARS-COV-2) (CORONAVIRUS DISEASE [COVID-19]), AMPLIFIED PROBE TECHNIQUE, MAKING USE OF HIGH THROUGHPUT TECHNOLOGIES AS DESCRIBED BY CMS-2020-01-R: HCPCS

## 2022-01-06 PROCEDURE — 83605 ASSAY OF LACTIC ACID: CPT

## 2022-01-06 PROCEDURE — 93005 ELECTROCARDIOGRAM TRACING: CPT

## 2022-01-06 PROCEDURE — 87040 BLOOD CULTURE FOR BACTERIA: CPT

## 2022-01-06 PROCEDURE — 96374 THER/PROPH/DIAG INJ IV PUSH: CPT

## 2022-01-06 PROCEDURE — 71046 X-RAY EXAM CHEST 2 VIEWS: CPT

## 2022-01-06 PROCEDURE — 85025 COMPLETE CBC W/AUTO DIFF WBC: CPT

## 2022-01-06 PROCEDURE — 74011250637 HC RX REV CODE- 250/637: Performed by: STUDENT IN AN ORGANIZED HEALTH CARE EDUCATION/TRAINING PROGRAM

## 2022-01-06 PROCEDURE — 80048 BASIC METABOLIC PNL TOTAL CA: CPT

## 2022-01-06 PROCEDURE — 99285 EMERGENCY DEPT VISIT HI MDM: CPT

## 2022-01-06 PROCEDURE — 74011000250 HC RX REV CODE- 250: Performed by: STUDENT IN AN ORGANIZED HEALTH CARE EDUCATION/TRAINING PROGRAM

## 2022-01-06 RX ORDER — AZITHROMYCIN 250 MG/1
500 TABLET, FILM COATED ORAL
Status: COMPLETED | OUTPATIENT
Start: 2022-01-06 | End: 2022-01-06

## 2022-01-06 RX ORDER — CEFDINIR 300 MG/1
300 CAPSULE ORAL 2 TIMES DAILY
Qty: 20 CAPSULE | Refills: 0 | Status: SHIPPED | OUTPATIENT
Start: 2022-01-06 | End: 2022-01-16

## 2022-01-06 RX ORDER — AZITHROMYCIN 250 MG/1
TABLET, FILM COATED ORAL
Qty: 4 TABLET | Refills: 0 | Status: SHIPPED | OUTPATIENT
Start: 2022-01-06 | End: 2022-01-11

## 2022-01-06 RX ADMIN — WATER 1 G: 1 INJECTION INTRAMUSCULAR; INTRAVENOUS; SUBCUTANEOUS at 08:19

## 2022-01-06 RX ADMIN — AZITHROMYCIN MONOHYDRATE 500 MG: 250 TABLET ORAL at 08:19

## 2022-01-06 RX ADMIN — SODIUM CHLORIDE 1000 ML: 900 INJECTION, SOLUTION INTRAVENOUS at 08:21

## 2022-01-06 NOTE — ED TRIAGE NOTES
Patient states she has been sick 1.5 weeks    States she is having chills, cannot sleep and fever that dies not go away

## 2022-01-06 NOTE — DISCHARGE INSTRUCTIONS
As we discussed you may return to the emergency department if you change your mind and wish to undergo treatment as we discussed. If you develop any sudden change in your symptoms including sudden/severe pain, difficulty breathing, passing out, or any other sudden/severe change please return immediately. If you develop any sudden change please contact your primary care doctor soon as possible arrange a follow-up appointment as soon as possible. Please complete the entire course of antibiotics even if your symptoms begin to improve.

## 2022-01-06 NOTE — ED NOTES
ED MD Dr. Trip Gu and RN spoke to patient about leaving AMA. Informed patient that she could get worse and potentially . Informed patient that her best interest would be to come into the hospital for additional IV antibiotic treatment. Patient continues to decline offer. Patient states she would like to leave and try antibiotics at home. AMA form signed. MD is writing up prescriptions and instructions. Patient was informed that she can change her mind at any time and come back.

## 2022-01-06 NOTE — Clinical Note
[address] [facility] EMERGENCY DEPT  [address] [address]  [phone]    Work/School Note    Date: 1/6/2022     To Whom It May concern:    Jennifer Smith was evaluated by the following provider(s):  Attending Provider: Macy Amaya MD.   [facility] virus is suspected. Per the CDC guidelines we recommend home isolation until the following conditions are all met:    1. At least five days have passed since symptoms first appeared and/or had a close exposure,   2. After home isolation for five days, wearing a mask around others for the next five days,  3. At least 24 have passed since last fever without the use of fever-reducing medications and  4.  Symptoms (eg cough, shortness of breath) have improved      Sincerely,          Rhoda Velasquez MD

## 2022-01-07 LAB — SARS-COV-2, NAA: DETECTED

## 2022-01-12 LAB
BACTERIA SPEC CULT: NORMAL
BACTERIA SPEC CULT: NORMAL
SERVICE CMNT-IMP: NORMAL
SERVICE CMNT-IMP: NORMAL

## 2022-06-22 NOTE — PROGRESS NOTES
[address]  Inpatient Progress Note      Date of Service: 8/1/2017   Hospital Day: 5      Subjective/Interval History     8/1/2017   Treatment Team Notes:  Notes reviewed and/or discussed. Cooperative and medication compliant. Denies SI, HI and AVH. Anxious. Comes to nurses station multiple times. Eating well.      Patient interview: Beau Mix was interviewed by this writer today. Much more calm today. Speech is clear, concise and of normal rate, tone and volume. Eating and sleeping well. Reports decreased anxiety with scheduling clonazepam.     Pt explained she takes Vicodin at home for pain. Pt notes benzodiazepine was discontinued due to combination of opiate analgesic with benzo. Denies depression, SI, HI and AVH. Paranoia is absent.      Objective     Visit Vitals    /67 (BP 1 Location: Right arm, BP Patient Position: Sitting)    Pulse 75    Temp 97 °F (36.1 °C)    Resp 16    Ht 5' 5\" (1.651 m)    Wt 49.9 kg (110 lb)    SpO2 99%    Breastfeeding No    BMI 18.3 kg/m2         Mental Status Examination      Appearance/Hygiene Good hygiene    Behavior/Social Relatedness appropriate   Musculoskeletal Gait/Station: appropriate  Tone (flaccid, cogwheeling, spastic): appropriate  Psychomotor (hyperkinetic, hypokinetic): appropriate   Involuntary movements (tics, dyskinesias, akathisia, stereotypies): appropriate   Speech                          Clear, concise and of normal rate, tone and volume. Mood                          euthymic   Affect                                                   euthymic   Thought Process Linear, logical and goal directed. Thought Content and Perceptual Disturbances Denies self-injurious behavior (SIB), suicidal ideation (SI), aggressive behavior or homicidal ideation (HI)     Denies auditory and visual hallucinations   Sensorium and Cognition              AOx4, cognition wnL.     Insight              fair   Judgment fair        Assessment/Plan       Psychiatric Diagnoses:   1. Benzodiazepine withdrawal  2. Major depressive disorder, recurrent, moderate     Medical Diagnoses: hypercholesterolemia     Psychosocial and contextual factors: medication changes.      Level of impairment/disability: atilio Sanchez is a 65 y.o. who is not stabilizing. Pt is likely experiencing benzodiazepine withdrawal. Explained to patient the likely diagnosis. Pt understood and agreed this is likely the case. I explained to the pt that she should not take opiate analgesics along with benzodiazepines as this is a dangerous combination. Pt agreed and expressed verbal understanding. Explained to the pt that she will need to speak with her neurologist about other pain management interventions for pain. Also explained to pt the nature of benzodiazepine withdrawal vs. Opiate withdrawal and their consequences including death with benzodiazepine withdrawal. Pt again expressed verbal understanding. Pt denies SI, HI and AVH at this time.      1. Discontinue Risperdal to 0.25mg BID. Will monitor on lower dosage. 2.  Will schedule clonazepam 0.5mg po BID for benzodiazepine withdrawal.   3.  Continue Paxil to 20mg (pt was taking 40mg at home and could be experiencing Paxil withdrawal).      Disposition: Home  Tentative date of discharge: 8-1-17     Rashad Nelson MD [facility]. [facility]  Psychiatry (4) no impairment